# Patient Record
Sex: MALE | Race: WHITE | NOT HISPANIC OR LATINO | Employment: OTHER | URBAN - METROPOLITAN AREA
[De-identification: names, ages, dates, MRNs, and addresses within clinical notes are randomized per-mention and may not be internally consistent; named-entity substitution may affect disease eponyms.]

---

## 2019-05-09 ENCOUNTER — HOSPITAL ENCOUNTER (EMERGENCY)
Facility: HOSPITAL | Age: 74
Discharge: HOME | End: 2019-05-09
Attending: EMERGENCY MEDICINE | Admitting: EMERGENCY MEDICINE
Payer: COMMERCIAL

## 2019-05-09 VITALS
RESPIRATION RATE: 20 BRPM | HEART RATE: 72 BPM | TEMPERATURE: 96 F | HEIGHT: 78 IN | DIASTOLIC BLOOD PRESSURE: 92 MMHG | SYSTOLIC BLOOD PRESSURE: 178 MMHG | BODY MASS INDEX: 19.67 KG/M2 | WEIGHT: 170 LBS | OXYGEN SATURATION: 99 %

## 2019-05-09 DIAGNOSIS — K08.89 PAIN, DENTAL: Primary | ICD-10-CM

## 2019-05-09 PROCEDURE — 99283 EMERGENCY DEPT VISIT LOW MDM: CPT

## 2019-05-09 PROCEDURE — 96372 THER/PROPH/DIAG INJ SC/IM: CPT

## 2019-05-09 PROCEDURE — 99283 EMERGENCY DEPT VISIT LOW MDM: CPT | Performed by: EMERGENCY MEDICINE

## 2019-05-09 RX ORDER — AMOXICILLIN AND CLAVULANATE POTASSIUM 875; 125 MG/1; MG/1
1 TABLET, FILM COATED ORAL EVERY 12 HOURS SCHEDULED
Qty: 14 TABLET | Refills: 0 | Status: SHIPPED | OUTPATIENT
Start: 2019-05-09 | End: 2019-05-16

## 2019-05-09 RX ORDER — KETOROLAC TROMETHAMINE 30 MG/ML
15 INJECTION, SOLUTION INTRAMUSCULAR; INTRAVENOUS ONCE
Status: COMPLETED | OUTPATIENT
Start: 2019-05-09 | End: 2019-05-09

## 2019-05-09 RX ORDER — CHLORHEXIDINE GLUCONATE 0.12 MG/ML
15 RINSE ORAL 2 TIMES DAILY
Qty: 120 ML | Refills: 0 | Status: SHIPPED | OUTPATIENT
Start: 2019-05-09

## 2019-05-09 RX ORDER — OXYCODONE HYDROCHLORIDE AND ACETAMINOPHEN 5; 325 MG/1; MG/1
TABLET ORAL
COMMUNITY
Start: 2016-09-11

## 2019-05-09 RX ORDER — AMOXICILLIN AND CLAVULANATE POTASSIUM 875; 125 MG/1; MG/1
1 TABLET, FILM COATED ORAL ONCE
Status: COMPLETED | OUTPATIENT
Start: 2019-05-09 | End: 2019-05-09

## 2019-05-09 RX ORDER — OMEPRAZOLE 40 MG/1
CAPSULE, DELAYED RELEASE ORAL
COMMUNITY
Start: 2016-04-04 | End: 2019-05-10 | Stop reason: SDUPTHER

## 2019-05-09 RX ADMIN — KETOROLAC TROMETHAMINE 15 MG: 30 INJECTION, SOLUTION INTRAMUSCULAR; INTRAVENOUS at 04:14

## 2019-05-09 RX ADMIN — AMOXICILLIN AND CLAVULANATE POTASSIUM 1 TABLET: 875; 125 TABLET, FILM COATED ORAL at 04:17

## 2019-05-10 ENCOUNTER — OFFICE VISIT (OUTPATIENT)
Dept: URGENT CARE | Facility: CLINIC | Age: 74
End: 2019-05-10
Payer: COMMERCIAL

## 2019-05-10 VITALS
DIASTOLIC BLOOD PRESSURE: 81 MMHG | OXYGEN SATURATION: 97 % | RESPIRATION RATE: 19 BRPM | WEIGHT: 173 LBS | HEART RATE: 91 BPM | BODY MASS INDEX: 23.43 KG/M2 | SYSTOLIC BLOOD PRESSURE: 164 MMHG | TEMPERATURE: 98 F | HEIGHT: 72 IN

## 2019-05-10 DIAGNOSIS — K08.89 PAIN, DENTAL: Primary | ICD-10-CM

## 2019-05-10 PROCEDURE — 99213 OFFICE O/P EST LOW 20 MIN: CPT | Performed by: PHYSICIAN ASSISTANT

## 2019-05-10 RX ORDER — MONTELUKAST SODIUM 10 MG/1
TABLET ORAL EVERY 24 HOURS
COMMUNITY
Start: 2019-04-16

## 2019-05-10 RX ORDER — FLUTICASONE FUROATE AND VILANTEROL 100; 25 UG/1; UG/1
POWDER RESPIRATORY (INHALATION) EVERY 24 HOURS
COMMUNITY
Start: 2019-02-01 | End: 2019-09-12 | Stop reason: ALTCHOICE

## 2019-05-14 ENCOUNTER — HOSPITAL ENCOUNTER (INPATIENT)
Facility: HOSPITAL | Age: 74
LOS: 1 days | Discharge: HOME | DRG: 351 | End: 2019-05-16
Attending: EMERGENCY MEDICINE | Admitting: SURGERY
Payer: COMMERCIAL

## 2019-05-14 ENCOUNTER — APPOINTMENT (EMERGENCY)
Dept: CT IMAGING | Facility: HOSPITAL | Age: 74
DRG: 351 | End: 2019-05-14
Payer: COMMERCIAL

## 2019-05-14 DIAGNOSIS — K40.90 LEFT INGUINAL HERNIA: ICD-10-CM

## 2019-05-14 DIAGNOSIS — K56.609 SBO (SMALL BOWEL OBSTRUCTION) (HCC): ICD-10-CM

## 2019-05-14 DIAGNOSIS — R10.32 LLQ ABDOMINAL PAIN: Primary | ICD-10-CM

## 2019-05-14 LAB
ALBUMIN SERPL BCP-MCNC: 3.3 G/DL (ref 3.5–5)
ALP SERPL-CCNC: 95 U/L (ref 46–116)
ALT SERPL W P-5'-P-CCNC: 53 U/L (ref 12–78)
ANION GAP SERPL CALCULATED.3IONS-SCNC: 11 MMOL/L (ref 4–13)
AST SERPL W P-5'-P-CCNC: 51 U/L (ref 5–45)
BASOPHILS # BLD AUTO: 0.05 THOUSANDS/ΜL (ref 0–0.1)
BASOPHILS NFR BLD AUTO: 1 % (ref 0–1)
BILIRUB SERPL-MCNC: 0.5 MG/DL (ref 0.2–1)
BUN SERPL-MCNC: 20 MG/DL (ref 5–25)
CALCIUM SERPL-MCNC: 9.6 MG/DL (ref 8.3–10.1)
CHLORIDE SERPL-SCNC: 99 MMOL/L (ref 100–108)
CLARITY, POC: CLEAR
CO2 SERPL-SCNC: 26 MMOL/L (ref 21–32)
COLOR, POC: CLEAR
CREAT SERPL-MCNC: 0.97 MG/DL (ref 0.6–1.3)
EOSINOPHIL # BLD AUTO: 0.06 THOUSAND/ΜL (ref 0–0.61)
EOSINOPHIL NFR BLD AUTO: 1 % (ref 0–6)
ERYTHROCYTE [DISTWIDTH] IN BLOOD BY AUTOMATED COUNT: 12.6 % (ref 11.6–15.1)
EXT BILIRUBIN, UA: NEGATIVE
EXT BLOOD URINE: NEGATIVE
EXT GLUCOSE, UA: NEGATIVE
EXT KETONES: NEGATIVE
EXT NITRITE, UA: NEGATIVE
EXT PH, UA: 6.5
EXT PROTEIN, UA: NEGATIVE
EXT SPECIFIC GRAVITY, UA: 1.01
EXT UROBILINOGEN: 0.2
GFR SERPL CREATININE-BSD FRML MDRD: 77 ML/MIN/1.73SQ M
GLUCOSE SERPL-MCNC: 108 MG/DL (ref 65–140)
HCT VFR BLD AUTO: 42.9 % (ref 36.5–49.3)
HGB BLD-MCNC: 14.2 G/DL (ref 12–17)
IMM GRANULOCYTES # BLD AUTO: 0.02 THOUSAND/UL (ref 0–0.2)
IMM GRANULOCYTES NFR BLD AUTO: 0 % (ref 0–2)
LYMPHOCYTES # BLD AUTO: 1.44 THOUSANDS/ΜL (ref 0.6–4.47)
LYMPHOCYTES NFR BLD AUTO: 24 % (ref 14–44)
MCH RBC QN AUTO: 32.2 PG (ref 26.8–34.3)
MCHC RBC AUTO-ENTMCNC: 33.1 G/DL (ref 31.4–37.4)
MCV RBC AUTO: 97 FL (ref 82–98)
MONOCYTES # BLD AUTO: 0.63 THOUSAND/ΜL (ref 0.17–1.22)
MONOCYTES NFR BLD AUTO: 10 % (ref 4–12)
NEUTROPHILS # BLD AUTO: 3.89 THOUSANDS/ΜL (ref 1.85–7.62)
NEUTS SEG NFR BLD AUTO: 64 % (ref 43–75)
NRBC BLD AUTO-RTO: 0 /100 WBCS
PLATELET # BLD AUTO: 246 THOUSANDS/UL (ref 149–390)
PMV BLD AUTO: 11.7 FL (ref 8.9–12.7)
POTASSIUM SERPL-SCNC: 4.5 MMOL/L (ref 3.5–5.3)
PROT SERPL-MCNC: 7.8 G/DL (ref 6.4–8.2)
RBC # BLD AUTO: 4.41 MILLION/UL (ref 3.88–5.62)
SODIUM SERPL-SCNC: 136 MMOL/L (ref 136–145)
WBC # BLD AUTO: 6.09 THOUSAND/UL (ref 4.31–10.16)
WBC # BLD EST: NEGATIVE 10*3/UL

## 2019-05-14 PROCEDURE — 99285 EMERGENCY DEPT VISIT HI MDM: CPT

## 2019-05-14 PROCEDURE — 85025 COMPLETE CBC W/AUTO DIFF WBC: CPT | Performed by: PHYSICIAN ASSISTANT

## 2019-05-14 PROCEDURE — 96374 THER/PROPH/DIAG INJ IV PUSH: CPT

## 2019-05-14 PROCEDURE — 96361 HYDRATE IV INFUSION ADD-ON: CPT

## 2019-05-14 PROCEDURE — 74177 CT ABD & PELVIS W/CONTRAST: CPT

## 2019-05-14 PROCEDURE — 80053 COMPREHEN METABOLIC PANEL: CPT | Performed by: PHYSICIAN ASSISTANT

## 2019-05-14 PROCEDURE — 81002 URINALYSIS NONAUTO W/O SCOPE: CPT | Performed by: PHYSICIAN ASSISTANT

## 2019-05-14 PROCEDURE — 36415 COLL VENOUS BLD VENIPUNCTURE: CPT | Performed by: PHYSICIAN ASSISTANT

## 2019-05-14 PROCEDURE — 99285 EMERGENCY DEPT VISIT HI MDM: CPT | Performed by: PHYSICIAN ASSISTANT

## 2019-05-14 RX ORDER — MORPHINE SULFATE 4 MG/ML
4 INJECTION, SOLUTION INTRAMUSCULAR; INTRAVENOUS ONCE
Status: COMPLETED | OUTPATIENT
Start: 2019-05-14 | End: 2019-05-14

## 2019-05-14 RX ORDER — CHLORAL HYDRATE 500 MG
1000 CAPSULE ORAL DAILY
COMMUNITY

## 2019-05-14 RX ORDER — FAMOTIDINE 40 MG/1
40 TABLET, FILM COATED ORAL EVERY 12 HOURS
Refills: 1 | COMMUNITY
Start: 2019-02-27

## 2019-05-14 RX ORDER — POLYETHYLENE GLYCOL 3350 17 G/17G
17 POWDER, FOR SOLUTION ORAL DAILY
COMMUNITY

## 2019-05-14 RX ADMIN — MORPHINE SULFATE 4 MG: 4 INJECTION INTRAVENOUS at 21:53

## 2019-05-14 RX ADMIN — IOHEXOL 100 ML: 350 INJECTION, SOLUTION INTRAVENOUS at 22:44

## 2019-05-14 RX ADMIN — SODIUM CHLORIDE 1000 ML: 0.9 INJECTION, SOLUTION INTRAVENOUS at 21:48

## 2019-05-15 ENCOUNTER — ANESTHESIA (INPATIENT)
Dept: PERIOP | Facility: HOSPITAL | Age: 74
DRG: 351 | End: 2019-05-15
Payer: COMMERCIAL

## 2019-05-15 ENCOUNTER — ANESTHESIA EVENT (INPATIENT)
Dept: PERIOP | Facility: HOSPITAL | Age: 74
DRG: 351 | End: 2019-05-15
Payer: COMMERCIAL

## 2019-05-15 PROBLEM — K56.609 SBO (SMALL BOWEL OBSTRUCTION) (HCC): Status: ACTIVE | Noted: 2019-05-14

## 2019-05-15 PROBLEM — K40.90 LEFT INGUINAL HERNIA: Status: ACTIVE | Noted: 2019-05-14

## 2019-05-15 PROCEDURE — 49651 LAP ING HERNIA REPAIR RECUR: CPT | Performed by: SURGERY

## 2019-05-15 PROCEDURE — C1781 MESH (IMPLANTABLE): HCPCS | Performed by: SURGERY

## 2019-05-15 PROCEDURE — 0YU54JZ SUPPLEMENT RIGHT INGUINAL REGION WITH SYNTHETIC SUBSTITUTE, PERCUTANEOUS ENDOSCOPIC APPROACH: ICD-10-PCS | Performed by: SURGERY

## 2019-05-15 PROCEDURE — 99222 1ST HOSP IP/OBS MODERATE 55: CPT | Performed by: SURGERY

## 2019-05-15 PROCEDURE — 49651 LAP ING HERNIA REPAIR RECUR: CPT | Performed by: PHYSICIAN ASSISTANT

## 2019-05-15 DEVICE — BARD 3DMAX MESH LEFT LARGE
Type: IMPLANTABLE DEVICE | Site: INGUINAL | Status: FUNCTIONAL
Brand: BARD 3DMAX MESH

## 2019-05-15 RX ORDER — FENTANYL CITRATE/PF 50 MCG/ML
50 SYRINGE (ML) INJECTION
Status: DISCONTINUED | OUTPATIENT
Start: 2019-05-15 | End: 2019-05-15 | Stop reason: HOSPADM

## 2019-05-15 RX ORDER — CEFAZOLIN SODIUM 2 G/50ML
2000 SOLUTION INTRAVENOUS ONCE
Status: DISCONTINUED | OUTPATIENT
Start: 2019-05-15 | End: 2019-05-15 | Stop reason: HOSPADM

## 2019-05-15 RX ORDER — MAGNESIUM HYDROXIDE 1200 MG/15ML
LIQUID ORAL AS NEEDED
Status: DISCONTINUED | OUTPATIENT
Start: 2019-05-15 | End: 2019-05-15 | Stop reason: HOSPADM

## 2019-05-15 RX ORDER — CEFAZOLIN SODIUM 2 G/50ML
SOLUTION INTRAVENOUS AS NEEDED
Status: DISCONTINUED | OUTPATIENT
Start: 2019-05-15 | End: 2019-05-15 | Stop reason: SURG

## 2019-05-15 RX ORDER — SODIUM CHLORIDE 9 MG/ML
125 INJECTION, SOLUTION INTRAVENOUS CONTINUOUS
Status: DISCONTINUED | OUTPATIENT
Start: 2019-05-15 | End: 2019-05-16

## 2019-05-15 RX ORDER — GLYCOPYRROLATE 0.2 MG/ML
INJECTION INTRAMUSCULAR; INTRAVENOUS AS NEEDED
Status: DISCONTINUED | OUTPATIENT
Start: 2019-05-15 | End: 2019-05-15 | Stop reason: SURG

## 2019-05-15 RX ORDER — DEXAMETHASONE SODIUM PHOSPHATE 10 MG/ML
INJECTION, SOLUTION INTRAMUSCULAR; INTRAVENOUS AS NEEDED
Status: DISCONTINUED | OUTPATIENT
Start: 2019-05-15 | End: 2019-05-15 | Stop reason: SURG

## 2019-05-15 RX ORDER — ONDANSETRON 2 MG/ML
4 INJECTION INTRAMUSCULAR; INTRAVENOUS ONCE AS NEEDED
Status: DISCONTINUED | OUTPATIENT
Start: 2019-05-15 | End: 2019-05-15 | Stop reason: HOSPADM

## 2019-05-15 RX ORDER — MIDAZOLAM HYDROCHLORIDE 1 MG/ML
INJECTION INTRAMUSCULAR; INTRAVENOUS AS NEEDED
Status: DISCONTINUED | OUTPATIENT
Start: 2019-05-15 | End: 2019-05-15 | Stop reason: SURG

## 2019-05-15 RX ORDER — HYDROMORPHONE HCL/PF 1 MG/ML
0.5 SYRINGE (ML) INJECTION
Status: DISCONTINUED | OUTPATIENT
Start: 2019-05-15 | End: 2019-05-15 | Stop reason: HOSPADM

## 2019-05-15 RX ORDER — ROCURONIUM BROMIDE 10 MG/ML
INJECTION, SOLUTION INTRAVENOUS AS NEEDED
Status: DISCONTINUED | OUTPATIENT
Start: 2019-05-15 | End: 2019-05-15 | Stop reason: SURG

## 2019-05-15 RX ORDER — NEOSTIGMINE METHYLSULFATE 1 MG/ML
INJECTION INTRAVENOUS AS NEEDED
Status: DISCONTINUED | OUTPATIENT
Start: 2019-05-15 | End: 2019-05-15 | Stop reason: SURG

## 2019-05-15 RX ORDER — HEPARIN SODIUM 5000 [USP'U]/ML
5000 INJECTION, SOLUTION INTRAVENOUS; SUBCUTANEOUS EVERY 8 HOURS SCHEDULED
Status: DISCONTINUED | OUTPATIENT
Start: 2019-05-15 | End: 2019-05-16 | Stop reason: HOSPADM

## 2019-05-15 RX ORDER — SODIUM CHLORIDE, SODIUM LACTATE, POTASSIUM CHLORIDE, CALCIUM CHLORIDE 600; 310; 30; 20 MG/100ML; MG/100ML; MG/100ML; MG/100ML
INJECTION, SOLUTION INTRAVENOUS CONTINUOUS PRN
Status: DISCONTINUED | OUTPATIENT
Start: 2019-05-15 | End: 2019-05-15

## 2019-05-15 RX ORDER — PROPOFOL 10 MG/ML
INJECTION, EMULSION INTRAVENOUS AS NEEDED
Status: DISCONTINUED | OUTPATIENT
Start: 2019-05-15 | End: 2019-05-15 | Stop reason: SURG

## 2019-05-15 RX ORDER — SODIUM CHLORIDE, SODIUM LACTATE, POTASSIUM CHLORIDE, CALCIUM CHLORIDE 600; 310; 30; 20 MG/100ML; MG/100ML; MG/100ML; MG/100ML
75 INJECTION, SOLUTION INTRAVENOUS CONTINUOUS
Status: ACTIVE | OUTPATIENT
Start: 2019-05-15 | End: 2019-05-15

## 2019-05-15 RX ORDER — FENTANYL CITRATE 50 UG/ML
INJECTION, SOLUTION INTRAMUSCULAR; INTRAVENOUS AS NEEDED
Status: DISCONTINUED | OUTPATIENT
Start: 2019-05-15 | End: 2019-05-15 | Stop reason: SURG

## 2019-05-15 RX ORDER — HYDRALAZINE HYDROCHLORIDE 20 MG/ML
10 INJECTION INTRAMUSCULAR; INTRAVENOUS EVERY 6 HOURS PRN
Status: DISCONTINUED | OUTPATIENT
Start: 2019-05-15 | End: 2019-05-16 | Stop reason: HOSPADM

## 2019-05-15 RX ADMIN — FENTANYL CITRATE 100 MCG: 50 INJECTION, SOLUTION INTRAMUSCULAR; INTRAVENOUS at 13:28

## 2019-05-15 RX ADMIN — DEXAMETHASONE SODIUM PHOSPHATE 8 MG: 10 INJECTION, SOLUTION INTRAMUSCULAR; INTRAVENOUS at 14:11

## 2019-05-15 RX ADMIN — HEPARIN SODIUM 5000 UNITS: 5000 INJECTION INTRAVENOUS; SUBCUTANEOUS at 21:08

## 2019-05-15 RX ADMIN — GLYCOPYRROLATE 0.4 MG: 0.2 INJECTION, SOLUTION INTRAMUSCULAR; INTRAVENOUS at 14:19

## 2019-05-15 RX ADMIN — CEFAZOLIN SODIUM 2000 MG: 2 SOLUTION INTRAVENOUS at 13:33

## 2019-05-15 RX ADMIN — GLYCOPYRROLATE 0.1 MG: 0.2 INJECTION, SOLUTION INTRAMUSCULAR; INTRAVENOUS at 13:28

## 2019-05-15 RX ADMIN — SODIUM CHLORIDE 125 ML/HR: 0.9 INJECTION, SOLUTION INTRAVENOUS at 23:26

## 2019-05-15 RX ADMIN — NEOSTIGMINE METHYLSULFATE 2 MG: 1 INJECTION INTRAVENOUS at 14:19

## 2019-05-15 RX ADMIN — MIDAZOLAM 2 MG: 1 INJECTION INTRAMUSCULAR; INTRAVENOUS at 13:28

## 2019-05-15 RX ADMIN — SODIUM CHLORIDE 125 ML/HR: 0.9 INJECTION, SOLUTION INTRAVENOUS at 01:58

## 2019-05-15 RX ADMIN — ROCURONIUM BROMIDE 50 MG: 10 INJECTION, SOLUTION INTRAVENOUS at 13:28

## 2019-05-15 RX ADMIN — FENTANYL CITRATE 50 MCG: 50 INJECTION, SOLUTION INTRAMUSCULAR; INTRAVENOUS at 14:47

## 2019-05-15 RX ADMIN — FENTANYL CITRATE 50 MCG: 50 INJECTION, SOLUTION INTRAMUSCULAR; INTRAVENOUS at 14:28

## 2019-05-15 RX ADMIN — ROCURONIUM BROMIDE 10 MG: 10 INJECTION, SOLUTION INTRAVENOUS at 14:11

## 2019-05-15 RX ADMIN — FENTANYL CITRATE 50 MCG: 50 INJECTION, SOLUTION INTRAMUSCULAR; INTRAVENOUS at 14:57

## 2019-05-15 RX ADMIN — SODIUM CHLORIDE, SODIUM LACTATE, POTASSIUM CHLORIDE, AND CALCIUM CHLORIDE: .6; .31; .03; .02 INJECTION, SOLUTION INTRAVENOUS at 13:10

## 2019-05-15 RX ADMIN — FENTANYL CITRATE 50 MCG: 50 INJECTION, SOLUTION INTRAMUSCULAR; INTRAVENOUS at 14:11

## 2019-05-15 RX ADMIN — PROPOFOL 120 MG: 10 INJECTION, EMULSION INTRAVENOUS at 13:28

## 2019-05-15 RX ADMIN — METRONIDAZOLE 500 MG: 500 INJECTION, SOLUTION INTRAVENOUS at 13:41

## 2019-05-15 RX ADMIN — MORPHINE SULFATE 2 MG: 2 INJECTION, SOLUTION INTRAMUSCULAR; INTRAVENOUS at 20:29

## 2019-05-15 RX ADMIN — MORPHINE SULFATE 2 MG: 2 INJECTION, SOLUTION INTRAMUSCULAR; INTRAVENOUS at 18:30

## 2019-05-16 VITALS
HEIGHT: 72 IN | HEART RATE: 74 BPM | OXYGEN SATURATION: 99 % | TEMPERATURE: 97.8 F | WEIGHT: 165.34 LBS | RESPIRATION RATE: 18 BRPM | DIASTOLIC BLOOD PRESSURE: 77 MMHG | SYSTOLIC BLOOD PRESSURE: 142 MMHG | BODY MASS INDEX: 22.4 KG/M2

## 2019-05-16 LAB — HCV AB SER QL: NORMAL

## 2019-05-16 PROCEDURE — 86803 HEPATITIS C AB TEST: CPT | Performed by: PHYSICIAN ASSISTANT

## 2019-05-16 PROCEDURE — 99024 POSTOP FOLLOW-UP VISIT: CPT | Performed by: PHYSICIAN ASSISTANT

## 2019-05-16 RX ORDER — ACETAMINOPHEN 325 MG/1
650 TABLET ORAL EVERY 6 HOURS PRN
Status: DISCONTINUED | OUTPATIENT
Start: 2019-05-16 | End: 2019-05-16 | Stop reason: HOSPADM

## 2019-05-16 RX ORDER — HYDROCODONE BITARTRATE AND ACETAMINOPHEN 5; 325 MG/1; MG/1
1-2 TABLET ORAL EVERY 4 HOURS PRN
Qty: 20 TABLET | Refills: 0 | Status: SHIPPED | OUTPATIENT
Start: 2019-05-16 | End: 2019-05-26

## 2019-05-16 RX ORDER — OXYCODONE HYDROCHLORIDE AND ACETAMINOPHEN 5; 325 MG/1; MG/1
1 TABLET ORAL EVERY 4 HOURS PRN
Status: DISCONTINUED | OUTPATIENT
Start: 2019-05-16 | End: 2019-05-16 | Stop reason: HOSPADM

## 2019-05-16 RX ADMIN — HEPARIN SODIUM 5000 UNITS: 5000 INJECTION INTRAVENOUS; SUBCUTANEOUS at 05:38

## 2019-05-16 RX ADMIN — SODIUM CHLORIDE 125 ML/HR: 0.9 INJECTION, SOLUTION INTRAVENOUS at 08:19

## 2019-05-16 RX ADMIN — OXYCODONE AND ACETAMINOPHEN 1 TABLET: 5; 325 TABLET ORAL at 11:14

## 2019-05-16 RX ADMIN — ACETAMINOPHEN 650 MG: 325 TABLET, FILM COATED ORAL at 05:34

## 2019-05-21 ENCOUNTER — TELEPHONE (OUTPATIENT)
Dept: SURGERY | Facility: MEDICAL CENTER | Age: 74
End: 2019-05-21

## 2019-05-30 ENCOUNTER — OFFICE VISIT (OUTPATIENT)
Dept: SURGERY | Facility: HOSPITAL | Age: 74
End: 2019-05-30

## 2019-05-30 VITALS
WEIGHT: 168 LBS | DIASTOLIC BLOOD PRESSURE: 76 MMHG | BODY MASS INDEX: 22.75 KG/M2 | HEART RATE: 76 BPM | SYSTOLIC BLOOD PRESSURE: 118 MMHG | RESPIRATION RATE: 16 BRPM | HEIGHT: 72 IN | TEMPERATURE: 99 F

## 2019-05-30 DIAGNOSIS — Z09 POSTOP CHECK: Primary | ICD-10-CM

## 2019-05-30 PROCEDURE — 99024 POSTOP FOLLOW-UP VISIT: CPT | Performed by: SURGERY

## 2019-07-22 ENCOUNTER — TRANSCRIBE ORDERS (OUTPATIENT)
Dept: ADMINISTRATIVE | Facility: HOSPITAL | Age: 74
End: 2019-07-22

## 2019-07-22 ENCOUNTER — HOSPITAL ENCOUNTER (OUTPATIENT)
Dept: RADIOLOGY | Facility: HOSPITAL | Age: 74
Discharge: HOME | End: 2019-07-22
Attending: FAMILY MEDICINE
Payer: COMMERCIAL

## 2019-07-22 DIAGNOSIS — R06.00 DYSPNEA, UNSPECIFIED TYPE: ICD-10-CM

## 2019-07-22 DIAGNOSIS — R06.00 DYSPNEA, UNSPECIFIED TYPE: Primary | ICD-10-CM

## 2019-07-22 PROCEDURE — 71046 X-RAY EXAM CHEST 2 VIEWS: CPT

## 2019-09-12 ENCOUNTER — OFFICE VISIT (OUTPATIENT)
Dept: SURGERY | Facility: HOSPITAL | Age: 74
End: 2019-09-12
Payer: COMMERCIAL

## 2019-09-12 VITALS
WEIGHT: 163.8 LBS | HEIGHT: 72 IN | TEMPERATURE: 99.7 F | SYSTOLIC BLOOD PRESSURE: 126 MMHG | RESPIRATION RATE: 16 BRPM | HEART RATE: 80 BPM | BODY MASS INDEX: 22.19 KG/M2 | DIASTOLIC BLOOD PRESSURE: 80 MMHG

## 2019-09-12 DIAGNOSIS — R10.31 RIGHT GROIN PAIN: Primary | ICD-10-CM

## 2019-09-12 PROCEDURE — 99212 OFFICE O/P EST SF 10 MIN: CPT | Performed by: SURGERY

## 2019-09-12 NOTE — PROGRESS NOTES
Assessment/Plan: Becky Ngo is a 68year old male who presents today status post laparoscopic left inguinal hernia repair performed on 5/15/19  Physical exam revealed no signs of recurrent hernias  Explained to him that his groin pain is most likely due to irritation of the groin area while he was ridding his bike  He may follow up as needed  He knows to contact the office if any questions or concerns arise  No problem-specific Assessment & Plan notes found for this encounter  There are no diagnoses linked to this encounter  Subjective:      Patient ID: Yesica Bustamante is a 68 y o  male  Becky Ngo is a 68year old male who presents today status post laparoscopic left inguinal hernia repair with mesh performed on 5/15/19  He reports having minor groin pain after a bike ride  He says that over all he is doing very well  The following portions of the patient's history were reviewed and updated as appropriate: allergies, current medications, past family history, past medical history, past social history, past surgical history and problem list     Review of Systems      Objective: There were no vitals taken for this visit  Physical Exam   Abdominal: No hernia  No obvious signs of recurrent hernias          By signing my name below, Marta Huffchastity, attest that this documentation has been prepared under the direction and in the presence of Adis Schmidt MD  Electronically Signed: Debra Tyler  9/12/19  Clarissa Cruz, personally performed the services described in this documentation  All medical record entries made by the scriboliver were at my direction and in my presence  I have reviewed the chart and discharge instructions and agree that the record reflects my personal performance and is accurate and complete  Adis Schmidt MD  9/12/19

## 2021-01-20 DIAGNOSIS — Z23 ENCOUNTER FOR IMMUNIZATION: ICD-10-CM

## 2021-02-12 ENCOUNTER — IMMUNIZATIONS (OUTPATIENT)
Dept: FAMILY MEDICINE CLINIC | Facility: HOSPITAL | Age: 76
End: 2021-02-12

## 2021-02-12 DIAGNOSIS — Z23 ENCOUNTER FOR IMMUNIZATION: Primary | ICD-10-CM

## 2021-02-12 PROCEDURE — 0011A SARS-COV-2 / COVID-19 MRNA VACCINE (MODERNA) 100 MCG: CPT

## 2021-02-12 PROCEDURE — 91301 SARS-COV-2 / COVID-19 MRNA VACCINE (MODERNA) 100 MCG: CPT

## 2021-03-12 ENCOUNTER — IMMUNIZATIONS (OUTPATIENT)
Dept: FAMILY MEDICINE CLINIC | Facility: HOSPITAL | Age: 76
End: 2021-03-12

## 2021-03-12 DIAGNOSIS — Z23 ENCOUNTER FOR IMMUNIZATION: Primary | ICD-10-CM

## 2021-03-12 PROCEDURE — 91301 SARS-COV-2 / COVID-19 MRNA VACCINE (MODERNA) 100 MCG: CPT

## 2021-03-12 PROCEDURE — 0012A SARS-COV-2 / COVID-19 MRNA VACCINE (MODERNA) 100 MCG: CPT

## 2024-04-02 ENCOUNTER — HOSPITAL ENCOUNTER (OUTPATIENT)
Dept: CT IMAGING | Facility: HOSPITAL | Age: 79
Discharge: HOME | End: 2024-04-02
Attending: OTOLARYNGOLOGY
Payer: COMMERCIAL

## 2024-04-02 DIAGNOSIS — J32.9 CHRONIC SINUSITIS, UNSPECIFIED LOCATION: ICD-10-CM

## 2024-04-02 PROCEDURE — 70486 CT MAXILLOFACIAL W/O DYE: CPT

## 2024-10-04 ENCOUNTER — CLINICAL SUPPORT (OUTPATIENT)
Dept: CARDIAC REHAB | Facility: HOSPITAL | Age: 79
End: 2024-10-04
Attending: INTERNAL MEDICINE
Payer: COMMERCIAL

## 2024-10-04 DIAGNOSIS — I21.3 ST ELEVATION MYOCARDIAL INFARCTION (STEMI), UNSPECIFIED ARTERY (HCC): Primary | ICD-10-CM

## 2024-10-04 PROCEDURE — 93797 PHYS/QHP OP CAR RHAB WO ECG: CPT

## 2024-10-04 NOTE — PROGRESS NOTES
CARDIAC REHABILITATION   ASSESSMENT AND INDIVIDUALIZED TREATMENT PLAN  INITIAL           Today's date: 10/4/2024   # of Exercise Sessions Completed: 1 - Initial Evaluation Today  Patient name: Arturo Terry      : 1945  Age: 78 y.o.       MRN: [id number]  Referring Physician: Brenden Leggett MD ([facility]) - Dr. Villa  Cardiologist: Brenden Leggett MD ([facility]) - ITPs send to Dr. Villa  Provider: [place]  Clinician: Palma Norris MS, CEP        Treatment is tailored to this patient's individual needs.  The ITP was reviewed with the patient and all questions were answered to their satisfaction.  Additional ITP documentation can be found electronically including daily and monthly exercise summaries, daily session notes with ECG summaries, education notes, daily medication reconciliation, and daily physician supervision.      Comments: Arturo presented today for his initial evaluation to cardiac rehab following a recent STEMI s/p LOUIE. He reports feeling well overall and no cardiac complaints at this time. His goals for the program are to return to yard work and return to riding his bike next spring. He also has a stationary bike at home he would like to begin using.         Dx:   Encounter Diagnosis   Name Primary?    ST elevation myocardial infarction (STEMI), unspecified artery (HCC) Yes       Description of Diagnosis: ST elevation myocardial infarction of anterior wall s/p LOUIE to proximal LAD  Date of onset: 2024  Other Cardiac History: afib following MI        ASSESSMENT    Medical History:   Past Medical History:   Diagnosis Date    GERD (gastroesophageal reflux disease)     Hypertension     Ulcerative colitis (HCC)        Family History:  No family history on file.    Allergies:   Amoxicillin-pot clavulanate, Azithromycin, and Fluticasone    Current Medications:   Current Outpatient Medications   Medication Sig Dispense Refill    ALPRAZolam (XANAX) 0.5 mg tablet Take 0.5 mg by mouth      amLODIPine (NORVASC) 5  mg tablet Take 5 mg by mouth every 24 hours      Aspirin 81 MG CAPS Take 81 mg by mouth      atorvastatin (LIPITOR) 20 mg tablet Take 20 mg by mouth daily at bedtime      calcium carbonate (TUMS EX) 750 mg chewable tablet Chew 1 tablet daily.      calcium carbonate-vitamin D (OSCAL-D) 500 mg-200 units per tablet Take 1 tablet by mouth daily with breakfast.      chlorhexidine (PERIDEX) 0.12 % solution Apply 15 mL to the mouth or throat 2 (two) times a day (Patient not taking: Reported on 9/12/2019) 120 mL 0    famotidine (PEPCID) 40 MG tablet Take 40 mg by mouth every 12 (twelve) hours  1    fluticasone-umeclidinium-vilanterol (TRELEGY ELLIPTA) 100-62.5-25 MCG/INH inhaler Inhale 1 puff daily Rinse mouth after use.      hydrocortisone (ANUSOL-HC) 2.5 % rectal cream Apply topically 2 (two) times a day prn      ipratropium (ATROVENT) 0.03 % nasal spray 2 sprays into each nostril 3 (three) times a day as needed for rhinitis 30 mL 3    mesalamine (LIALDA) 1.2 G EC tablet Take 1,200 mg by mouth daily with breakfast.      metoprolol succinate (TOPROL-XL) 25 mg 24 hr tablet Take 25 mg by mouth daily      montelukast (SINGULAIR) 10 mg tablet every 24 hours      Omega-3 Fatty Acids (FISH OIL) 1,000 mg Take 1,000 mg by mouth daily      omeprazole (PriLOSEC) 40 MG capsule Take 40 mg by mouth daily.      oxyCODONE-acetaminophen (PERCOCET) 5-325 mg per tablet Take by mouth      pantoprazole (PROTONIX) 40 mg tablet Take 40 mg by mouth daily      polyethylene glycol (MIRALAX) 17 g packet Take 17 g by mouth daily      sertraline (ZOLOFT) 50 mg tablet Take 100 mg by mouth daily      vitamin E, tocopherol, 400 units capsule Take 400 Units by mouth daily.       No current facility-administered medications for this visit.       Medication compliance: Yes   Comments: Pt reports to be compliant with medications    Physical Limitations: occasional knee pain going up stairs    Fall Risk: Low   Comments: Ambulates with a steady gait with no  assist device    Cultural needs: none      CAD Risk Factors:  Cholesterol: Yes  HTN: Yes  DM: No  Obesity: No   Inactivity: Yes      EXERCISE ASSESSMENT:     Initial Fitness Assessment: Submaximal TM ETT:  Resting:  BP: 114/72  HR: 80, Exercise:  BP: 136/76  HR: 94, METs:  4.3, ECG Summary: NSR, and Test terminated at:  RPE 6      ECG INTERPRETATION:  NSR    Current Functional Status:  Occupation: retired  Recreation/Physical Activity: wood working, yard work  ADL’s:Capable of performing light to moderate ADLs  [place]: Capable of performing light to moderate ADLs  Home exercise: none  Other Comments: has stationary bike at home      SMART Exercise Goals:   10% improvement in functional capacity based on max METs achieved in initial fitness assessment  improved DASI score by 10%  increased exercise capacity by 40% based on peak METs tolerated in cardiac rehab exercise session  maintain > 150 minutes per week of moderate intensity exercise    Patient Specific EXERCISE GOALS:       Get back to doing yard work (raking leaves, etc)  Return to riding bike next spring  Use stationary bike at home    Functional Capacity Screening Tool:  [facility] Activity Status Index:  4.73 METs      PSYCHOSOCIAL ASSESSMENT:    Date of last Assessment:  10/4/2024  Depression screening:  PHQ-9 = 1    Interpretation:  1-4 = Minimal Depression  Anxiety screening:  NEEL-7 = 1    Interpretation: 0-4  = Not anxious    Pt self-report of depression and anxiety   Patient has a history of anxiety   Reports sufficient emotional support     Self-reported stress level:  3   Stressors:  managing current health condition and wives health as well  Stress Management Tools:  woodworking    SMART Psychosocial Goals:     Physical Fitness in [facility] Score < 3    Patient Specific PSYCHOCOSOCIAL GOALS:    Continue to manage stress/anxiety  Continue wood working as a stress reliever    Quality of Life Screen:  (Higher score indicates disease impact on  "QOL)  [facility] COOP score: 14/45     Social Support:   children  Community/Social Activities: spending time with family, wood working with a friend     Psychosocial Assessment as it relates to rehabilitation:   Patient denies issues with his/her family or home life that may affect their rehabilitation efforts.       NUTRITION ASSESSMENT:    Initial Weight:  179.2 lbs  Current Weight:     Height:   Ht Readings from Last 1 Encounters:   08/02/24 6' (1.829 m)       Rate Your Plate Score: 56/81    Diabetes: N/A  A1c: no labs in chart   last measured: n/a    Lipid management:  no labs in chart    Current Dietary Habits:  \"Fair diet\". Watches sodium intake, eats a lot of fruits, could eat more vegetables, drinks two 24 oz bottles of water/day    SMART Nutrition Goals:   Improved Rate Your Plate score  >58    Patient Specific NUTRITION GOALS:     1. Utilize meal plan service that delivers fresh ingredients ready to use in a meal   3. Increase vegetable intake    Drug/Alcohol Use:   N/A      OTHER CORE COMPONENT ASSESSMENT:    Tobacco Use:     N/A:  Patient is a non-smoker     Anginal Symptoms:   chest pain   NTG use: No prescription    SMART Goals:   consistent, controlled resting BP < 130/80 and medication compliance    Patient Specific CORE COMPONENT GOALS:    Continue to check weight weekly at home and reports any abnormal changes  Continue to monitor BP at home and reports any abnormal trends      INDIVIDUALIZED TREATMENT PLAN      EXERCISE GOALS and PLAN      Progress toward Exercise goals:   Reviewed Pt goals and determined plan of care, Will continue to educate and progress as tolerated.    Exercise Intervention/plan:    education on home exercise guidelines, home exercise 30+ mins 2 days opposite CR, Group class: Risk Factors for Heart Disease, and Patient education:   Exercise After Cardiac Rehabilitation    The patient was counseled on exercise guidelines to achieve a minimum of 150 mins/wk of moderate intensity " (RPE 4-6)   exercise and encouraged to add 1-2 days of exercise on opposite days of cardiac rehab as tolerated.       PHYSICIAN PRESCRIBED EXERCISE:    Current Aerobic Exercise Prescription:      Frequency: 3 days/week   Supplement with home exercise 2+ days/wk as tolerated       Minutes: 20 - 40         METS: 3.5 - 4.5            HR: RHR +30-40bpm   RPE: 4-6         Modalities: Treadmill, UBE, Lifecycle, NuStep, and Recumbent bike     Exercise workloads will be progressed gradually as tolerated, within limits of patient's ability, and according to the patient's   response to the exercise program.      Aerobic Exercise Prescription Plan for Progression   Frequency: 3 days/week of cardiac rehab       Supplement with home exercise 2+ days/wk as tolerated    Minutes: 40       >150 mins/wk of moderate intensity exercise   METS: 4.0 - 5.0   HR: RHR +30-40bpm     RPE: 4-6   Modalities: Treadmill, UBE, Lifecycle, Rower, NuStep, and Recumbent bike    Strength trainin-3 days / week  12-15 repetitions  1-2 sets per modality   Will be added following 2-3 weeks of monitored exercise sessions   Modalities: Pull Downs, Lateral Raise, Arm Extension, Arm Curl, and leg extensions    Home Exercise: none    Exercise Education: benefit of exercise for CAD risk factors, home exercise guidelines, AHA guidelines to achieve >150 mins/wk of moderate exercise, and RPE scale     Readiness to change: Preparation:  (Getting ready to change)       NUTRITION GOALS AND PLAN      Nutritional   Reviewed patient's Rate your Plate. Discussed key elements of heart healthy eating. Reviewed patient goals for dietary modifications and their clinical implications.  Reviewed most recent lipid profile.     Patient's progress toward Nutrition goals:    Reviewed Pt goals and determined plan of care, Will continue to educate and progress as tolerated.      Nutrition Intervention/plan:   group class: Reading Food Labels and group Class: Heart Healthy  Eating    Measurable goals were based Rate Your Plate Dietary Self-Assessment. These are the areas in which the patient could score higher on the assessment.  Goals include recommendations for a heart healthy diet based on American Heart Association.    Nutrition Education:   heart healthy eating principles  low sodium diet  maintaining hydration  nutrition for  lipid management    Readiness to change: Preparation:  (Getting ready to change)       PSYCHOSOCIAL GOALS AND PLAN    Psychosocial Assessment as it relates to rehabilitation:   Patient denies issues with his/her family or home life that may affect their rehabilitation efforts.     Patient's progress toward Psychosocial goals:    Reviewed Pt goals and determined plan of care, Will continue to educate and progress as tolerated.    Psychosocial Intervention/plan:   Class: Stress and Your Health and Class: Relaxation    Psychosocial Education: signs/sxs of depression, benefits of a positive support system, and stress management techniques    Information to utilize [employer] was provided as well as contact information for counseling through  Behavioral Health and group psychotherapy groups available.    Readiness to change: Preparation:  (Getting ready to change)       OTHER CORE COMPONENTS GOALS and PLAN      Blood Pressure will be monitored throughout the program and cardiologist will be notified of elevated trends.    Pt will be encouraged to monitor home BP if advised by cardiologist.    Tobacco Intervention/plan:   N/A:  Pt is a non-smoker    Progress toward Core Component goals:   Reviewed Pt goals and determined plan of care, Will continue to educate and progress as tolerated.    Other Core Components Intervention:   group class: Understanding Heart Disease, group class: Common Heart Medications, medication compliance, use salt substitutes, check labels for sodium content, and monitor home BP    Group and Individual Education:  understanding high  blood pressure and it's relationship to CAD and components of blood pressure management    Readiness to change: Preparation:  (Getting ready to change)

## 2024-10-07 ENCOUNTER — CLINICAL SUPPORT (OUTPATIENT)
Dept: CARDIAC REHAB | Facility: HOSPITAL | Age: 79
End: 2024-10-07
Payer: COMMERCIAL

## 2024-10-07 DIAGNOSIS — I21.3 ST ELEVATION MYOCARDIAL INFARCTION (STEMI), UNSPECIFIED ARTERY (HCC): Primary | ICD-10-CM

## 2024-10-07 PROCEDURE — 93798 PHYS/QHP OP CAR RHAB W/ECG: CPT

## 2024-10-09 ENCOUNTER — CLINICAL SUPPORT (OUTPATIENT)
Dept: CARDIAC REHAB | Facility: HOSPITAL | Age: 79
End: 2024-10-09
Payer: COMMERCIAL

## 2024-10-09 DIAGNOSIS — I21.3 ST ELEVATION MYOCARDIAL INFARCTION (STEMI), UNSPECIFIED ARTERY (HCC): Primary | ICD-10-CM

## 2024-10-09 PROCEDURE — 93798 PHYS/QHP OP CAR RHAB W/ECG: CPT

## 2024-10-11 ENCOUNTER — CLINICAL SUPPORT (OUTPATIENT)
Dept: CARDIAC REHAB | Facility: HOSPITAL | Age: 79
End: 2024-10-11
Payer: COMMERCIAL

## 2024-10-11 DIAGNOSIS — I21.3 ST ELEVATION MYOCARDIAL INFARCTION (STEMI), UNSPECIFIED ARTERY (HCC): Primary | ICD-10-CM

## 2024-10-11 PROCEDURE — 93798 PHYS/QHP OP CAR RHAB W/ECG: CPT

## 2024-10-14 ENCOUNTER — CLINICAL SUPPORT (OUTPATIENT)
Dept: CARDIAC REHAB | Facility: HOSPITAL | Age: 79
End: 2024-10-14
Payer: COMMERCIAL

## 2024-10-14 DIAGNOSIS — I21.3 ST ELEVATION MYOCARDIAL INFARCTION (STEMI), UNSPECIFIED ARTERY (HCC): Primary | ICD-10-CM

## 2024-10-14 PROCEDURE — 93798 PHYS/QHP OP CAR RHAB W/ECG: CPT

## 2024-10-16 ENCOUNTER — CLINICAL SUPPORT (OUTPATIENT)
Dept: CARDIAC REHAB | Facility: HOSPITAL | Age: 79
End: 2024-10-16
Payer: COMMERCIAL

## 2024-10-16 DIAGNOSIS — I21.3 ST ELEVATION MYOCARDIAL INFARCTION (STEMI), UNSPECIFIED ARTERY (HCC): Primary | ICD-10-CM

## 2024-10-16 PROCEDURE — 93798 PHYS/QHP OP CAR RHAB W/ECG: CPT

## 2024-10-18 ENCOUNTER — CLINICAL SUPPORT (OUTPATIENT)
Dept: CARDIAC REHAB | Facility: HOSPITAL | Age: 79
End: 2024-10-18
Payer: COMMERCIAL

## 2024-10-18 DIAGNOSIS — I21.3 ST ELEVATION MYOCARDIAL INFARCTION (STEMI), UNSPECIFIED ARTERY (HCC): Primary | ICD-10-CM

## 2024-10-18 PROCEDURE — 93798 PHYS/QHP OP CAR RHAB W/ECG: CPT

## 2024-10-21 ENCOUNTER — CLINICAL SUPPORT (OUTPATIENT)
Dept: CARDIAC REHAB | Facility: HOSPITAL | Age: 79
End: 2024-10-21
Payer: COMMERCIAL

## 2024-10-21 DIAGNOSIS — I21.3 ST ELEVATION MYOCARDIAL INFARCTION (STEMI), UNSPECIFIED ARTERY (HCC): Primary | ICD-10-CM

## 2024-10-21 PROCEDURE — 93798 PHYS/QHP OP CAR RHAB W/ECG: CPT

## 2024-10-23 ENCOUNTER — CLINICAL SUPPORT (OUTPATIENT)
Dept: CARDIAC REHAB | Facility: HOSPITAL | Age: 79
End: 2024-10-23
Payer: COMMERCIAL

## 2024-10-23 DIAGNOSIS — I21.3 ST ELEVATION MYOCARDIAL INFARCTION (STEMI), UNSPECIFIED ARTERY (HCC): Primary | ICD-10-CM

## 2024-10-23 PROCEDURE — 93798 PHYS/QHP OP CAR RHAB W/ECG: CPT

## 2024-10-25 ENCOUNTER — CLINICAL SUPPORT (OUTPATIENT)
Dept: CARDIAC REHAB | Facility: HOSPITAL | Age: 79
End: 2024-10-25
Payer: COMMERCIAL

## 2024-10-25 DIAGNOSIS — I21.3 ST ELEVATION MYOCARDIAL INFARCTION (STEMI), UNSPECIFIED ARTERY (HCC): Primary | ICD-10-CM

## 2024-10-25 PROCEDURE — 93798 PHYS/QHP OP CAR RHAB W/ECG: CPT

## 2024-10-28 ENCOUNTER — APPOINTMENT (OUTPATIENT)
Dept: CARDIAC REHAB | Facility: HOSPITAL | Age: 79
End: 2024-10-28
Payer: COMMERCIAL

## 2024-10-30 ENCOUNTER — CLINICAL SUPPORT (OUTPATIENT)
Dept: CARDIAC REHAB | Facility: HOSPITAL | Age: 79
End: 2024-10-30
Payer: COMMERCIAL

## 2024-10-30 DIAGNOSIS — I21.3 ST ELEVATION MYOCARDIAL INFARCTION (STEMI), UNSPECIFIED ARTERY (HCC): Primary | ICD-10-CM

## 2024-10-30 PROCEDURE — 93798 PHYS/QHP OP CAR RHAB W/ECG: CPT

## 2024-10-30 NOTE — PROGRESS NOTES
CARDIAC REHABILITATION   ASSESSMENT AND INDIVIDUALIZED TREATMENT PLAN  30 DAY          Today's date: 10/30/2024   # of Exercise Sessions Completed: 11  Patient name: Arturo Terry      : 1945  Age: 79 y.o.       MRN: [id number]  Referring Physician: Brenden Leggett MD ([facility]) - Dr. Villa  Cardiologist: Brenden Leggett MD ([facility]) - ITPs send to Dr. Villa  Provider: [place]  Clinician: Palma Norris MS, CEP        Treatment is tailored to this patient's individual needs.  The ITP was reviewed with the patient and all questions were answered to their satisfaction.  Additional ITP documentation can be found electronically including daily and monthly exercise summaries, daily session notes with ECG summaries, education notes, daily medication reconciliation, and daily physician supervision.      Comments: Arturo has attended 11 sessions of CR in the past 30 days and is progressing well. He has no cardiac complaints at this time and reports improvement in his strength and endurance. He has been able to reach up to 3.41 METs with exercise in rehab. A light strength training component has been added to his routine at rehab.         Dx:   Encounter Diagnosis   Name Primary?    ST elevation myocardial infarction (STEMI), unspecified artery (HCC) Yes       Description of Diagnosis: ST elevation myocardial infarction of anterior wall s/p LOUIE to proximal LAD  Date of onset: 2024  Other Cardiac History: afib following MI        ASSESSMENT    Medical History:   Past Medical History:   Diagnosis Date    GERD (gastroesophageal reflux disease)     Hypertension     Ulcerative colitis (HCC)        Family History:  No family history on file.    Allergies:   Amoxicillin-pot clavulanate, Azithromycin, and Fluticasone    Current Medications:   Current Outpatient Medications   Medication Sig Dispense Refill    ALPRAZolam (XANAX) 0.5 mg tablet Take 0.5 mg by mouth      amLODIPine (NORVASC) 5 mg tablet Take 5 mg by mouth every 24 hours       Aspirin 81 MG CAPS Take 81 mg by mouth      atorvastatin (LIPITOR) 20 mg tablet Take 20 mg by mouth daily at bedtime      calcium carbonate (TUMS EX) 750 mg chewable tablet Chew 1 tablet daily.      calcium carbonate-vitamin D (OSCAL-D) 500 mg-200 units per tablet Take 1 tablet by mouth daily with breakfast.      chlorhexidine (PERIDEX) 0.12 % solution Apply 15 mL to the mouth or throat 2 (two) times a day (Patient not taking: Reported on 9/12/2019) 120 mL 0    famotidine (PEPCID) 40 MG tablet Take 40 mg by mouth every 12 (twelve) hours  1    fluticasone-umeclidinium-vilanterol (TRELEGY ELLIPTA) 100-62.5-25 MCG/INH inhaler Inhale 1 puff daily Rinse mouth after use.      hydrocortisone (ANUSOL-HC) 2.5 % rectal cream Apply topically 2 (two) times a day prn      ipratropium (ATROVENT) 0.03 % nasal spray 2 sprays into each nostril 3 (three) times a day as needed for rhinitis 30 mL 3    mesalamine (LIALDA) 1.2 G EC tablet Take 1,200 mg by mouth daily with breakfast.      metoprolol succinate (TOPROL-XL) 25 mg 24 hr tablet Take 25 mg by mouth daily      montelukast (SINGULAIR) 10 mg tablet every 24 hours      Omega-3 Fatty Acids (FISH OIL) 1,000 mg Take 1,000 mg by mouth daily      omeprazole (PriLOSEC) 40 MG capsule Take 40 mg by mouth daily.      oxyCODONE-acetaminophen (PERCOCET) 5-325 mg per tablet Take by mouth      pantoprazole (PROTONIX) 40 mg tablet Take 40 mg by mouth daily      polyethylene glycol (MIRALAX) 17 g packet Take 17 g by mouth daily      sertraline (ZOLOFT) 50 mg tablet Take 100 mg by mouth daily      vitamin E, tocopherol, 400 units capsule Take 400 Units by mouth daily.       No current facility-administered medications for this visit.       Medication compliance: Yes   Comments: Pt reports to be compliant with medications    Physical Limitations: occasional knee pain going up stairs    Fall Risk: Low   Comments: Ambulates with a steady gait with no assist device    Cultural needs:  none      CAD Risk Factors:  Cholesterol: Yes  HTN: Yes  DM: No  Obesity: No   Inactivity: Yes      EXERCISE ASSESSMENT:     Initial Fitness Assessment: Submaximal TM ETT:  Resting:  BP: 114/72  HR: 80, Exercise:  BP: 136/76  HR: 94, METs:  4.3, ECG Summary: NSR, and Test terminated at:  RPE 6      ECG INTERPRETATION:  NSR    Current Functional Status:  Occupation: retired  Recreation/Physical Activity: wood working, yard work  ADL’s:Capable of performing light to moderate ADLs  Coos: Capable of performing light to moderate ADLs  Home exercise: none  Other Comments: has stationary bike at home      SMART Exercise Goals:   10% improvement in functional capacity based on max METs achieved in initial fitness assessment  improved DASI score by 10%  increased exercise capacity by 40% based on peak METs tolerated in cardiac rehab exercise session  maintain > 150 minutes per week of moderate intensity exercise    Patient Specific EXERCISE GOALS:       Get back to doing yard work (raking leaves, etc)  Return to riding bike next spring  Use stationary bike at home    Functional Capacity Screening Tool:  [facility] Activity Status Index:  4.73 METs      PSYCHOSOCIAL ASSESSMENT:    Date of last Assessment:  10/4/2024  Depression screening:  PHQ-9 = 1    Interpretation:  1-4 = Minimal Depression  Anxiety screening:  NEEL-7 = 1    Interpretation: 0-4  = Not anxious    Pt self-report of depression and anxiety   Patient has a history of anxiety   Reports sufficient emotional support     Self-reported stress level:  3   Stressors:  managing current health condition and wives health as well  Stress Management Tools:  woodworking    SMART Psychosocial Goals:     Physical Fitness in [facility] Score < 3    Patient Specific PSYCHOCOSOCIAL GOALS:    Continue to manage stress/anxiety  Continue wood working as a stress reliever    Quality of Life Screen:  (Higher score indicates disease impact on QOL)  [facility] COOP score:  "14/45     Social Support:   children  Community/Social Activities: spending time with family, wood working with a friend     Psychosocial Assessment as it relates to rehabilitation:   Patient denies issues with his/her family or home life that may affect their rehabilitation efforts.       NUTRITION ASSESSMENT:    Initial Weight:  179.2 lbs  Current Weight: 181.8 lbs    Height:   Ht Readings from Last 1 Encounters:   08/02/24 6' (1.829 m)       Rate Your Plate Score: 56/81    Diabetes: N/A  A1c: no labs in chart   last measured: n/a    Lipid management:  no labs in chart    Current Dietary Habits:  \"Fair diet\". Watches sodium intake, eats a lot of fruits, could eat more vegetables, drinks two 24 oz bottles of water/day    SMART Nutrition Goals:   Improved Rate Your Plate score  >58    Patient Specific NUTRITION GOALS:     1. Utilize meal plan service that delivers fresh ingredients ready to use in a meal   3. Increase vegetable intake    Drug/Alcohol Use:   N/A      OTHER CORE COMPONENT ASSESSMENT:    Tobacco Use:     N/A:  Patient is a non-smoker     Anginal Symptoms:   chest pain   NTG use: No prescription    SMART Goals:   consistent, controlled resting BP < 130/80 and medication compliance    Patient Specific CORE COMPONENT GOALS:    Continue to check weight weekly at home and reports any abnormal changes  Continue to monitor BP at home and reports any abnormal trends      INDIVIDUALIZED TREATMENT PLAN      EXERCISE GOALS and PLAN      Progress toward Exercise goals:   Pt is progressing and showing improvement  toward the following goals:  increasing exercise capacity in rehab as tolerated, maintaining >150 min/wk of moderate intensity exercise, is back to doing yard work such as raking leaves without issue, has not been back to riding his bike yet however plans to do so this upcoming spring.  , Will continue to educate and progress as tolerated.    Exercise Intervention/plan:    education on home exercise " guidelines, home exercise 30+ mins 2 days opposite CR, Group class: Risk Factors for Heart Disease, and Patient education:   Exercise After Cardiac Rehabilitation    The patient was counseled on exercise guidelines to achieve a minimum of 150 mins/wk of moderate intensity (RPE 4-6)   exercise and encouraged to add 1-2 days of exercise on opposite days of cardiac rehab as tolerated.       PHYSICIAN PRESCRIBED EXERCISE:    Current Aerobic Exercise Prescription:      Frequency: 3 days/week   Supplement with home exercise 2+ days/wk as tolerated       Minutes: 20 - 40         METS: 3.0 - 3.5            HR: RHR +30-40bpm   RPE: 4-6         Modalities: Treadmill, UBE, Lifecycle, NuStep, and Recumbent bike     Exercise workloads will be progressed gradually as tolerated, within limits of patient's ability, and according to the patient's   response to the exercise program.      Aerobic Exercise Prescription Plan for Progression   Frequency: 3 days/week of cardiac rehab       Supplement with home exercise 2+ days/wk as tolerated    Minutes: 40       >150 mins/wk of moderate intensity exercise   METS: 3.5 - 4.9   HR: RHR +30-40bpm     RPE: 4-6   Modalities: Treadmill, UBE, Lifecycle, Rower, NuStep, and Recumbent bike    Strength trainin-3 days / week  12-15 repetitions  1-2 sets per modality    Modalities: Pull Downs, Lateral Raise, Arm Extension, Arm Curl, and leg extensions    Home Exercise: none    Exercise Education: benefit of exercise for CAD risk factors, home exercise guidelines, AHA guidelines to achieve >150 mins/wk of moderate exercise, RPE scale, and Group class: Risk Factors for Heart Disease     Readiness to change: Action:  (Changing behavior)      NUTRITION GOALS AND PLAN      Nutritional   Reviewed patient's Rate your Plate. Discussed key elements of heart healthy eating. Reviewed patient goals for dietary modifications and their clinical implications.  Reviewed most recent lipid profile.     Patient's  progress toward Nutrition goals:    Pt is progressing and showing improvement  toward the following goals:  continues to utilize meal plan service, increasing vegetable intake as tolerated.  , Will continue to educate and progress as tolerated.      Nutrition Intervention/plan:   group class: Reading Food Labels and group Class: Heart Healthy Eating    Measurable goals were based Rate Your Plate Dietary Self-Assessment. These are the areas in which the patient could score higher on the assessment.  Goals include recommendations for a heart healthy diet based on American Heart Association.    Nutrition Education:   heart healthy eating principles  low sodium diet  maintaining hydration  nutrition for  lipid management    Readiness to change: Action:  (Changing behavior)      PSYCHOSOCIAL GOALS AND PLAN    Psychosocial Assessment as it relates to rehabilitation:   Patient denies issues with his/her family or home life that may affect their rehabilitation efforts.     Patient's progress toward Psychosocial goals:    Pt is progressing and showing improvement  toward the following goals:  continuing to manage stress and anxiety using wood working as a stress reliever.  , Will continue to educate and progress as tolerated.    Psychosocial Intervention/plan:   Class: Stress and Your Health and Class: Relaxation    Psychosocial Education: signs/sxs of depression, benefits of a positive support system, and stress management techniques    Information to utilize [employer] was provided as well as contact information for counseling through  [facility] and group psychotherapy groups available.    Readiness to change: Action:  (Changing behavior)      OTHER CORE COMPONENTS GOALS and PLAN      Blood Pressure will be monitored throughout the program and cardiologist will be notified of elevated trends.    Pt will be encouraged to monitor home BP if advised by cardiologist.    Tobacco Intervention/plan:   N/A:  Pt is a  non-smoker    Progress toward Core Component goals:   Pt is progressing and showing improvement  toward the following goals:  BP WNL in rehab, medication compliance reported, continues to monitor weight at home, continues to monitor BP at home.  , Will continue to educate and progress as tolerated.    Other Core Components Intervention:   group class: Understanding Heart Disease, group class: Common Heart Medications, medication compliance, use salt substitutes, check labels for sodium content, and monitor home BP    Group and Individual Education:  understanding high blood pressure and it's relationship to CAD, components of blood pressure management, and group class: Understanding Heart Disease    Readiness to change: Action:  (Changing behavior)

## 2024-11-01 ENCOUNTER — CLINICAL SUPPORT (OUTPATIENT)
Dept: CARDIAC REHAB | Facility: HOSPITAL | Age: 79
End: 2024-11-01
Payer: COMMERCIAL

## 2024-11-01 DIAGNOSIS — I21.3 ST ELEVATION MYOCARDIAL INFARCTION (STEMI), UNSPECIFIED ARTERY (HCC): Primary | ICD-10-CM

## 2024-11-01 PROCEDURE — 93798 PHYS/QHP OP CAR RHAB W/ECG: CPT

## 2024-11-04 ENCOUNTER — CLINICAL SUPPORT (OUTPATIENT)
Dept: CARDIAC REHAB | Facility: HOSPITAL | Age: 79
End: 2024-11-04
Payer: COMMERCIAL

## 2024-11-04 DIAGNOSIS — I21.3 ST ELEVATION MYOCARDIAL INFARCTION (STEMI), UNSPECIFIED ARTERY (HCC): Primary | ICD-10-CM

## 2024-11-04 PROCEDURE — 93798 PHYS/QHP OP CAR RHAB W/ECG: CPT

## 2024-11-06 ENCOUNTER — APPOINTMENT (OUTPATIENT)
Dept: CARDIAC REHAB | Facility: HOSPITAL | Age: 79
End: 2024-11-06
Payer: COMMERCIAL

## 2024-11-08 ENCOUNTER — CLINICAL SUPPORT (OUTPATIENT)
Dept: CARDIAC REHAB | Facility: HOSPITAL | Age: 79
End: 2024-11-08
Payer: COMMERCIAL

## 2024-11-08 DIAGNOSIS — I21.3 ST ELEVATION MYOCARDIAL INFARCTION (STEMI), UNSPECIFIED ARTERY (HCC): Primary | ICD-10-CM

## 2024-11-08 PROCEDURE — 93798 PHYS/QHP OP CAR RHAB W/ECG: CPT

## 2024-11-11 ENCOUNTER — CLINICAL SUPPORT (OUTPATIENT)
Dept: CARDIAC REHAB | Facility: HOSPITAL | Age: 79
End: 2024-11-11
Payer: COMMERCIAL

## 2024-11-11 DIAGNOSIS — I21.3 ST ELEVATION MYOCARDIAL INFARCTION (STEMI), UNSPECIFIED ARTERY (HCC): Primary | ICD-10-CM

## 2024-11-11 PROCEDURE — 93798 PHYS/QHP OP CAR RHAB W/ECG: CPT

## 2024-11-13 ENCOUNTER — CLINICAL SUPPORT (OUTPATIENT)
Dept: CARDIAC REHAB | Facility: HOSPITAL | Age: 79
End: 2024-11-13
Payer: COMMERCIAL

## 2024-11-13 DIAGNOSIS — I21.3 ST ELEVATION MYOCARDIAL INFARCTION (STEMI), UNSPECIFIED ARTERY (HCC): Primary | ICD-10-CM

## 2024-11-13 PROCEDURE — 93798 PHYS/QHP OP CAR RHAB W/ECG: CPT

## 2024-11-15 ENCOUNTER — CLINICAL SUPPORT (OUTPATIENT)
Dept: CARDIAC REHAB | Facility: HOSPITAL | Age: 79
End: 2024-11-15
Payer: COMMERCIAL

## 2024-11-15 DIAGNOSIS — I21.3 ST ELEVATION MYOCARDIAL INFARCTION (STEMI), UNSPECIFIED ARTERY (HCC): Primary | ICD-10-CM

## 2024-11-15 PROCEDURE — 93798 PHYS/QHP OP CAR RHAB W/ECG: CPT

## 2024-11-18 ENCOUNTER — CLINICAL SUPPORT (OUTPATIENT)
Dept: CARDIAC REHAB | Facility: HOSPITAL | Age: 79
End: 2024-11-18
Payer: COMMERCIAL

## 2024-11-18 DIAGNOSIS — I21.3 ST ELEVATION MYOCARDIAL INFARCTION (STEMI), UNSPECIFIED ARTERY (HCC): Primary | ICD-10-CM

## 2024-11-18 PROCEDURE — 93798 PHYS/QHP OP CAR RHAB W/ECG: CPT

## 2024-11-20 ENCOUNTER — CLINICAL SUPPORT (OUTPATIENT)
Dept: CARDIAC REHAB | Facility: HOSPITAL | Age: 79
End: 2024-11-20
Payer: COMMERCIAL

## 2024-11-20 DIAGNOSIS — I21.3 ST ELEVATION MYOCARDIAL INFARCTION (STEMI), UNSPECIFIED ARTERY (HCC): Primary | ICD-10-CM

## 2024-11-20 PROCEDURE — 93798 PHYS/QHP OP CAR RHAB W/ECG: CPT

## 2024-11-22 ENCOUNTER — CLINICAL SUPPORT (OUTPATIENT)
Dept: CARDIAC REHAB | Facility: HOSPITAL | Age: 79
End: 2024-11-22
Payer: COMMERCIAL

## 2024-11-22 DIAGNOSIS — I21.3 ST ELEVATION MYOCARDIAL INFARCTION (STEMI), UNSPECIFIED ARTERY (HCC): Primary | ICD-10-CM

## 2024-11-22 PROCEDURE — 93798 PHYS/QHP OP CAR RHAB W/ECG: CPT

## 2024-11-25 ENCOUNTER — CLINICAL SUPPORT (OUTPATIENT)
Dept: CARDIAC REHAB | Facility: HOSPITAL | Age: 79
End: 2024-11-25
Payer: COMMERCIAL

## 2024-11-25 DIAGNOSIS — I21.3 ST ELEVATION MYOCARDIAL INFARCTION (STEMI), UNSPECIFIED ARTERY (HCC): Primary | ICD-10-CM

## 2024-11-25 PROCEDURE — 93798 PHYS/QHP OP CAR RHAB W/ECG: CPT

## 2024-11-27 ENCOUNTER — APPOINTMENT (OUTPATIENT)
Dept: CARDIAC REHAB | Facility: HOSPITAL | Age: 79
End: 2024-11-27
Payer: COMMERCIAL

## 2024-11-27 NOTE — PROGRESS NOTES
CARDIAC REHABILITATION   ASSESSMENT AND INDIVIDUALIZED TREATMENT PLAN  60 DAY          Today's date: 2024   # of Exercise Sessions Completed: 21  Patient name: Arturo Terry      : 1945  Age: 79 y.o.       MRN: [id number]  Referring Physician: Brenden Leggett MD ([facility]) - Dr. Villa  Cardiologist: Brenden Leggett MD ([facility]) - ITPs send to Dr. Villa  Provider: [place]  Clinician: Lashanda Quesada MS, CEP      Treatment is tailored to this patient's individual needs.  The ITP was reviewed with the patient and all questions were answered to their satisfaction.  Additional ITP documentation can be found electronically including daily and monthly exercise summaries, daily session notes with ECG summaries, education notes, daily medication reconciliation, and daily physician supervision.      Comments: Arturo has attended 21 sessions of CR in the past 60 days and is progressing well. He has no cardiac complaints at this time and reports improvement in his strength and endurance. He has been able to reach up to 3.6-4.7 METs with exercise in rehab. A light strength training component has been added to his routine at rehab. He is in agreement to continue attending CR sessions over the next 30 days.        Dx:   Encounter Diagnosis   Name Primary?    ST elevation myocardial infarction (STEMI), unspecified artery (HCC) Yes       Description of Diagnosis: ST elevation myocardial infarction of anterior wall s/p LOUIE to proximal LAD  Date of onset: 2024  Other Cardiac History: afib following MI        ASSESSMENT    Medical History:   Past Medical History:   Diagnosis Date    GERD (gastroesophageal reflux disease)     Hypertension     Ulcerative colitis (HCC)        Family History:  No family history on file.    Allergies:   Amoxicillin-pot clavulanate, Azithromycin, and Fluticasone    Current Medications:   Current Outpatient Medications   Medication Sig Dispense Refill    ALPRAZolam (XANAX) 0.5 mg tablet Take 0.5 mg by  mouth      amLODIPine (NORVASC) 5 mg tablet Take 5 mg by mouth every 24 hours      Aspirin 81 MG CAPS Take 81 mg by mouth      atorvastatin (LIPITOR) 20 mg tablet Take 20 mg by mouth daily at bedtime      calcium carbonate (TUMS EX) 750 mg chewable tablet Chew 1 tablet daily.      calcium carbonate-vitamin D (OSCAL-D) 500 mg-200 units per tablet Take 1 tablet by mouth daily with breakfast.      chlorhexidine (PERIDEX) 0.12 % solution Apply 15 mL to the mouth or throat 2 (two) times a day (Patient not taking: Reported on 9/12/2019) 120 mL 0    famotidine (PEPCID) 40 MG tablet Take 40 mg by mouth every 12 (twelve) hours  1    fluticasone-umeclidinium-vilanterol (TRELEGY ELLIPTA) 100-62.5-25 MCG/INH inhaler Inhale 1 puff daily Rinse mouth after use.      hydrocortisone (ANUSOL-HC) 2.5 % rectal cream Apply topically 2 (two) times a day prn      ipratropium (ATROVENT) 0.03 % nasal spray 2 sprays into each nostril 3 (three) times a day as needed for rhinitis 30 mL 3    mesalamine (LIALDA) 1.2 G EC tablet Take 1,200 mg by mouth daily with breakfast.      metoprolol succinate (TOPROL-XL) 25 mg 24 hr tablet Take 25 mg by mouth daily      montelukast (SINGULAIR) 10 mg tablet every 24 hours      Omega-3 Fatty Acids (FISH OIL) 1,000 mg Take 1,000 mg by mouth daily      omeprazole (PriLOSEC) 40 MG capsule Take 40 mg by mouth daily.      oxyCODONE-acetaminophen (PERCOCET) 5-325 mg per tablet Take by mouth      pantoprazole (PROTONIX) 40 mg tablet Take 40 mg by mouth daily      polyethylene glycol (MIRALAX) 17 g packet Take 17 g by mouth daily      sertraline (ZOLOFT) 50 mg tablet Take 100 mg by mouth daily      vitamin E, tocopherol, 400 units capsule Take 400 Units by mouth daily.       No current facility-administered medications for this visit.       Medication compliance: Yes   Comments: Pt reports to be compliant with medications    Physical Limitations: occasional knee pain going up stairs    Fall Risk: Low   Comments:  Ambulates with a steady gait with no assist device    Cultural needs: none      CAD Risk Factors:  Cholesterol: Yes  HTN: Yes  DM: No  Obesity: No   Inactivity: Yes      EXERCISE ASSESSMENT:     Initial Fitness Assessment: Submaximal TM ETT:  Resting:  BP: 114/72  HR: 80, Exercise:  BP: 136/76  HR: 94, METs:  4.3, ECG Summary: NSR, and Test terminated at:  RPE 6      ECG INTERPRETATION:  NSR    Current Functional Status:  Occupation: retired  Recreation/Physical Activity: wood working, yard work  ADL’s:Capable of performing light to moderate ADLs  [place]: Capable of performing light to moderate ADLs  Home exercise: none  Other Comments: has stationary bike at home      SMART Exercise Goals:   10% improvement in functional capacity based on max METs achieved in initial fitness assessment  improved DASI score by 10%  increased exercise capacity by 40% based on peak METs tolerated in cardiac rehab exercise session  maintain > 150 minutes per week of moderate intensity exercise    Patient Specific EXERCISE GOALS:       Get back to doing yard work (raking leaves, etc)  Return to riding bike next spring  Use stationary bike at home    Functional Capacity Screening Tool:  [facility] Activity Status Index:  4.73 METs      PSYCHOSOCIAL ASSESSMENT:    Date of last Assessment:  10/4/2024  Depression screening:  PHQ-9 = 1    Interpretation:  1-4 = Minimal Depression  Anxiety screening:  NEEL-7 = 1    Interpretation: 0-4  = Not anxious    Pt self-report of depression and anxiety   Patient has a history of anxiety   Reports sufficient emotional support     Self-reported stress level:  3   Stressors:  managing current health condition and wives health as well  Stress Management Tools:  woodworking    SMART Psychosocial Goals:     Physical Fitness in [facility] Score < 3    Patient Specific PSYCHOCOSOCIAL GOALS:    Continue to manage stress/anxiety  Continue wood working as a stress reliever    Quality of Life Screen:  (Higher score  "indicates disease impact on QOL)  [facility] COOP score: 14/45     Social Support:   children  Community/Social Activities: spending time with family, wood working with a friend     Psychosocial Assessment as it relates to rehabilitation:   Patient denies issues with his/her family or home life that may affect their rehabilitation efforts.       NUTRITION ASSESSMENT:    Initial Weight:  179.2 lbs  Current Weight: 180.6 lbs    Height:   Ht Readings from Last 1 Encounters:   08/02/24 6' (1.829 m)       Rate Your Plate Score: 56/81    Diabetes: N/A  A1c: no labs in chart   last measured: n/a    Lipid management:  no labs in chart    Current Dietary Habits:  \"Fair diet\". Watches sodium intake, eats a lot of fruits, could eat more vegetables, drinks two 24 oz bottles of water/day    SMART Nutrition Goals:   Improved Rate Your Plate score  >58    Patient Specific NUTRITION GOALS:     1. Utilize meal plan service that delivers fresh ingredients ready to use in a meal   3. Increase vegetable intake    Drug/Alcohol Use:   N/A      OTHER CORE COMPONENT ASSESSMENT:    Tobacco Use:     N/A:  Patient is a non-smoker     Anginal Symptoms:   chest pain   NTG use: No prescription    SMART Goals:   consistent, controlled resting BP < 130/80 and medication compliance    Patient Specific CORE COMPONENT GOALS:    Continue to check weight weekly at home and reports any abnormal changes  Continue to monitor BP at home and reports any abnormal trends      INDIVIDUALIZED TREATMENT PLAN      EXERCISE GOALS and PLAN      Progress toward Exercise goals:   Pt is progressing and showing improvement  toward the following goals:  increasing exercise capacity in rehab as tolerated, maintaining >150 min/wk of moderate intensity exercise, is back to doing yard work such as raking leaves without issue, has not been back to riding his bike yet however plans to do so this upcoming spring.  , Will continue to educate and progress as " tolerated.    Exercise Intervention/plan:    education on home exercise guidelines, home exercise 30+ mins 2 days opposite CR, Group class: Risk Factors for Heart Disease, and Patient education:   Exercise After Cardiac Rehabilitation    The patient was counseled on exercise guidelines to achieve a minimum of 150 mins/wk of moderate intensity (RPE 4-6)   exercise and encouraged to add 1-2 days of exercise on opposite days of cardiac rehab as tolerated.       PHYSICIAN PRESCRIBED EXERCISE:    Current Aerobic Exercise Prescription:      Frequency: 3 days/week   Supplement with home exercise 2+ days/wk as tolerated       Minutes: 40-50         METS: 3.6-4.7            HR: RHR +30-40bpm   RPE: 4-6         Modalities: Treadmill, UBE, Lifecycle, NuStep, and Recumbent bike     Exercise workloads will be progressed gradually as tolerated, within limits of patient's ability, and according to the patient's   response to the exercise program.      Aerobic Exercise Prescription Plan for Progression   Frequency: 3 days/week of cardiac rehab       Supplement with home exercise 2+ days/wk as tolerated    Minutes: 40-55       >150 mins/wk of moderate intensity exercise   METS: 3.6 - 5.0   HR: RHR +30-40bpm     RPE: 4-6   Modalities: Treadmill, UBE, Lifecycle, Rower, NuStep, and Recumbent bike    Strength trainin-3 days / week  12-15 repetitions  1-2 sets per modality    Modalities: Pull Downs, Lateral Raise, Arm Extension, Arm Curl, and leg extensions    Home Exercise: none    Exercise Education: benefit of exercise for CAD risk factors, home exercise guidelines, AHA guidelines to achieve >150 mins/wk of moderate exercise, RPE scale, and Group class: Risk Factors for Heart Disease     Readiness to change: Action:  (Changing behavior)      NUTRITION GOALS AND PLAN      Nutritional   Reviewed patient's Rate your Plate. Discussed key elements of heart healthy eating. Reviewed patient goals for dietary modifications and their  clinical implications.  Reviewed most recent lipid profile.     Patient's progress toward Nutrition goals:    Pt is progressing and showing improvement  toward the following goals:  continues to utilize meal plan service, increasing vegetable intake as tolerated.  , Will continue to educate and progress as tolerated.      Nutrition Intervention/plan:   group class: Reading Food Labels and group Class: Heart Healthy Eating    Measurable goals were based Rate Your Plate Dietary Self-Assessment. These are the areas in which the patient could score higher on the assessment.  Goals include recommendations for a heart healthy diet based on American Heart Association.    Nutrition Education:   heart healthy eating principles  low sodium diet  maintaining hydration  nutrition for  lipid management    Readiness to change: Action:  (Changing behavior)      PSYCHOSOCIAL GOALS AND PLAN    Psychosocial Assessment as it relates to rehabilitation:   Patient denies issues with his/her family or home life that may affect their rehabilitation efforts.     Patient's progress toward Psychosocial goals:    Pt is progressing and showing improvement  toward the following goals:  continuing to manage stress and anxiety using wood working as a stress reliever.  , Will continue to educate and progress as tolerated.    Psychosocial Intervention/plan:   Class: Stress and Your Health and Class: Relaxation    Psychosocial Education: signs/sxs of depression, benefits of a positive support system, and stress management techniques    Information to utilize [employer] was provided as well as contact information for counseling through  [facility] and group psychotherapy groups available.    Readiness to change: Action:  (Changing behavior)      OTHER CORE COMPONENTS GOALS and PLAN      Blood Pressure will be monitored throughout the program and cardiologist will be notified of elevated trends.    Pt will be encouraged to monitor home BP if  advised by cardiologist.    Tobacco Intervention/plan:   N/A:  Pt is a non-smoker    Progress toward Core Component goals:   Pt is progressing and showing improvement  toward the following goals:  BP WNL in rehab, medication compliance reported, continues to monitor weight at home, continues to monitor BP at home.  , Will continue to educate and progress as tolerated.    Other Core Components Intervention:   group class: Understanding Heart Disease, group class: Common Heart Medications, medication compliance, use salt substitutes, check labels for sodium content, and monitor home BP    Group and Individual Education:  understanding high blood pressure and it's relationship to CAD, components of blood pressure management, and group class: Understanding Heart Disease    Readiness to change: Action:  (Changing behavior)

## 2024-11-29 ENCOUNTER — APPOINTMENT (OUTPATIENT)
Dept: CARDIAC REHAB | Facility: HOSPITAL | Age: 79
End: 2024-11-29
Payer: COMMERCIAL

## 2024-12-02 ENCOUNTER — CLINICAL SUPPORT (OUTPATIENT)
Dept: CARDIAC REHAB | Facility: HOSPITAL | Age: 79
End: 2024-12-02
Payer: COMMERCIAL

## 2024-12-02 ENCOUNTER — APPOINTMENT (OUTPATIENT)
Dept: CARDIAC REHAB | Facility: HOSPITAL | Age: 79
End: 2024-12-02
Payer: COMMERCIAL

## 2024-12-02 DIAGNOSIS — I21.3 ST ELEVATION MYOCARDIAL INFARCTION (STEMI), UNSPECIFIED ARTERY (HCC): Primary | ICD-10-CM

## 2024-12-02 PROCEDURE — 93798 PHYS/QHP OP CAR RHAB W/ECG: CPT

## 2024-12-03 ENCOUNTER — APPOINTMENT (OUTPATIENT)
Dept: CARDIAC REHAB | Facility: HOSPITAL | Age: 79
End: 2024-12-03
Payer: COMMERCIAL

## 2024-12-04 ENCOUNTER — APPOINTMENT (OUTPATIENT)
Dept: CARDIAC REHAB | Facility: HOSPITAL | Age: 79
End: 2024-12-04
Payer: COMMERCIAL

## 2024-12-04 ENCOUNTER — CLINICAL SUPPORT (OUTPATIENT)
Dept: CARDIAC REHAB | Facility: HOSPITAL | Age: 79
End: 2024-12-04
Payer: COMMERCIAL

## 2024-12-04 DIAGNOSIS — I21.3 ST ELEVATION MYOCARDIAL INFARCTION (STEMI), UNSPECIFIED ARTERY (HCC): Primary | ICD-10-CM

## 2024-12-04 PROCEDURE — 93798 PHYS/QHP OP CAR RHAB W/ECG: CPT

## 2024-12-05 ENCOUNTER — APPOINTMENT (OUTPATIENT)
Dept: CARDIAC REHAB | Facility: HOSPITAL | Age: 79
End: 2024-12-05
Payer: COMMERCIAL

## 2024-12-06 ENCOUNTER — CLINICAL SUPPORT (OUTPATIENT)
Dept: CARDIAC REHAB | Facility: HOSPITAL | Age: 79
End: 2024-12-06
Payer: COMMERCIAL

## 2024-12-06 DIAGNOSIS — I21.3 ST ELEVATION MYOCARDIAL INFARCTION (STEMI), UNSPECIFIED ARTERY (HCC): Primary | ICD-10-CM

## 2024-12-06 PROCEDURE — 93798 PHYS/QHP OP CAR RHAB W/ECG: CPT

## 2024-12-09 ENCOUNTER — APPOINTMENT (OUTPATIENT)
Dept: CARDIAC REHAB | Facility: HOSPITAL | Age: 79
End: 2024-12-09
Payer: COMMERCIAL

## 2024-12-11 ENCOUNTER — CLINICAL SUPPORT (OUTPATIENT)
Dept: CARDIAC REHAB | Facility: HOSPITAL | Age: 79
End: 2024-12-11
Payer: COMMERCIAL

## 2024-12-11 DIAGNOSIS — I21.3 ST ELEVATION MYOCARDIAL INFARCTION (STEMI), UNSPECIFIED ARTERY (HCC): Primary | ICD-10-CM

## 2024-12-11 PROCEDURE — 93798 PHYS/QHP OP CAR RHAB W/ECG: CPT

## 2024-12-11 NOTE — PROGRESS NOTES
Exercise session detail attached.  Low BP at recovery. Reported lightheadedness and blurry vision. Hydrated 16 oz water. Sat with feet elevated. BP came up to 102/62 after 30 min of rest. Pt denied any lightheadedness, blurry vision , or other symptoms of low BP. Pt provided water for car ride home

## 2024-12-13 ENCOUNTER — CLINICAL SUPPORT (OUTPATIENT)
Dept: CARDIAC REHAB | Facility: HOSPITAL | Age: 79
End: 2024-12-13
Payer: COMMERCIAL

## 2024-12-13 DIAGNOSIS — I21.3 ST ELEVATION MYOCARDIAL INFARCTION (STEMI), UNSPECIFIED ARTERY (HCC): Primary | ICD-10-CM

## 2024-12-13 PROCEDURE — 93798 PHYS/QHP OP CAR RHAB W/ECG: CPT

## 2024-12-16 ENCOUNTER — CLINICAL SUPPORT (OUTPATIENT)
Dept: CARDIAC REHAB | Facility: HOSPITAL | Age: 79
End: 2024-12-16
Payer: COMMERCIAL

## 2024-12-16 DIAGNOSIS — I21.3 ST ELEVATION MYOCARDIAL INFARCTION (STEMI), UNSPECIFIED ARTERY (HCC): Primary | ICD-10-CM

## 2024-12-16 PROCEDURE — 93798 PHYS/QHP OP CAR RHAB W/ECG: CPT

## 2024-12-18 ENCOUNTER — CLINICAL SUPPORT (OUTPATIENT)
Dept: CARDIAC REHAB | Facility: HOSPITAL | Age: 79
End: 2024-12-18
Payer: COMMERCIAL

## 2024-12-18 DIAGNOSIS — I21.3 ST ELEVATION MYOCARDIAL INFARCTION (STEMI), UNSPECIFIED ARTERY (HCC): Primary | ICD-10-CM

## 2024-12-18 PROCEDURE — 93798 PHYS/QHP OP CAR RHAB W/ECG: CPT

## 2024-12-20 ENCOUNTER — CLINICAL SUPPORT (OUTPATIENT)
Dept: CARDIAC REHAB | Facility: HOSPITAL | Age: 79
End: 2024-12-20
Payer: COMMERCIAL

## 2024-12-20 DIAGNOSIS — I21.3 ST ELEVATION MYOCARDIAL INFARCTION (STEMI), UNSPECIFIED ARTERY (HCC): Primary | ICD-10-CM

## 2024-12-20 PROCEDURE — 93798 PHYS/QHP OP CAR RHAB W/ECG: CPT

## 2024-12-23 ENCOUNTER — CLINICAL SUPPORT (OUTPATIENT)
Dept: CARDIAC REHAB | Facility: HOSPITAL | Age: 79
End: 2024-12-23
Payer: COMMERCIAL

## 2024-12-23 DIAGNOSIS — I21.3 ST ELEVATION MYOCARDIAL INFARCTION (STEMI), UNSPECIFIED ARTERY (HCC): Primary | ICD-10-CM

## 2024-12-23 PROCEDURE — 93798 PHYS/QHP OP CAR RHAB W/ECG: CPT

## 2024-12-26 NOTE — PROGRESS NOTES
CARDIAC REHABILITATION   ASSESSMENT AND INDIVIDUALIZED TREATMENT PLAN  90 DAY          Today's date: 2024   # of Exercise Sessions Completed: 30  Patient name: Arturo Terry      : 1945  Age: 79 y.o.       MRN: [id number]  Referring Physician: Brenden Leggett MD ([facility]) - Dr. Villa  Cardiologist: Brenden Leggett MD ([facility]) - ITPs send to Dr. Villa  Provider: [place]  Clinician: Palma Norris MS, CEP      Treatment is tailored to this patient's individual needs.  The ITP was reviewed with the patient and all questions were answered to their satisfaction.  Additional ITP documentation can be found electronically including daily and monthly exercise summaries, daily session notes with ECG summaries, education notes, daily medication reconciliation, and daily physician supervision.      Comments: Arturo has attended 30 sessions of CR in the past 90 days and is progressing well. He has no cardiac complaints at this time and reports improvement in his strength and endurance. He has been able to reach up to 3.6-4.8 METs with exercise in rehab. A light strength training component has been added to his routine at rehab. He is in agreement to continue attending CR sessions over the next 30 days.        Dx:   Encounter Diagnosis   Name Primary?    ST elevation myocardial infarction (STEMI), unspecified artery (HCC) Yes       Description of Diagnosis: ST elevation myocardial infarction of anterior wall s/p LOUIE to proximal LAD  Date of onset: 2024  Other Cardiac History: afib following MI        ASSESSMENT    Medical History:   Past Medical History:   Diagnosis Date    GERD (gastroesophageal reflux disease)     Hypertension     Ulcerative colitis (HCC)        Family History:  No family history on file.    Allergies:   Amoxicillin-pot clavulanate, Azithromycin, and Fluticasone    Current Medications:   Current Outpatient Medications   Medication Sig Dispense Refill    ALPRAZolam (XANAX) 0.5 mg tablet Take 0.5 mg by  mouth      amLODIPine (NORVASC) 5 mg tablet Take 5 mg by mouth every 24 hours      Aspirin 81 MG CAPS Take 81 mg by mouth      atorvastatin (LIPITOR) 20 mg tablet Take 20 mg by mouth daily at bedtime      calcium carbonate (TUMS EX) 750 mg chewable tablet Chew 1 tablet daily.      calcium carbonate-vitamin D (OSCAL-D) 500 mg-200 units per tablet Take 1 tablet by mouth daily with breakfast.      chlorhexidine (PERIDEX) 0.12 % solution Apply 15 mL to the mouth or throat 2 (two) times a day (Patient not taking: Reported on 9/12/2019) 120 mL 0    famotidine (PEPCID) 40 MG tablet Take 40 mg by mouth every 12 (twelve) hours  1    fluticasone-umeclidinium-vilanterol (TRELEGY ELLIPTA) 100-62.5-25 MCG/INH inhaler Inhale 1 puff daily Rinse mouth after use.      hydrocortisone (ANUSOL-HC) 2.5 % rectal cream Apply topically 2 (two) times a day prn      ipratropium (ATROVENT) 0.03 % nasal spray 2 sprays into each nostril 3 (three) times a day as needed for rhinitis 30 mL 3    mesalamine (LIALDA) 1.2 G EC tablet Take 1,200 mg by mouth daily with breakfast.      metoprolol succinate (TOPROL-XL) 25 mg 24 hr tablet Take 25 mg by mouth daily      montelukast (SINGULAIR) 10 mg tablet every 24 hours      Omega-3 Fatty Acids (FISH OIL) 1,000 mg Take 1,000 mg by mouth daily      omeprazole (PriLOSEC) 40 MG capsule Take 40 mg by mouth daily.      oxyCODONE-acetaminophen (PERCOCET) 5-325 mg per tablet Take by mouth      pantoprazole (PROTONIX) 40 mg tablet Take 40 mg by mouth daily      polyethylene glycol (MIRALAX) 17 g packet Take 17 g by mouth daily      sertraline (ZOLOFT) 50 mg tablet Take 100 mg by mouth daily      vitamin E, tocopherol, 400 units capsule Take 400 Units by mouth daily.       No current facility-administered medications for this visit.       Medication compliance: Yes   Comments: Pt reports to be compliant with medications    Physical Limitations: occasional knee pain going up stairs    Fall Risk: Low   Comments:  Ambulates with a steady gait with no assist device    Cultural needs: none      CAD Risk Factors:  Cholesterol: Yes  HTN: Yes  DM: No  Obesity: No   Inactivity: Yes      EXERCISE ASSESSMENT:     Initial Fitness Assessment: Submaximal TM ETT:  Resting:  BP: 114/72  HR: 80, Exercise:  BP: 136/76  HR: 94, METs:  4.3, ECG Summary: NSR, and Test terminated at:  RPE 6      ECG INTERPRETATION:  NSR    Current Functional Status:  Occupation: retired  Recreation/Physical Activity: wood working, yard work  ADL’s:Capable of performing light to moderate ADLs  [place]: Capable of performing light to moderate ADLs  Home exercise: none  Other Comments: has stationary bike at home      SMART Exercise Goals:   10% improvement in functional capacity based on max METs achieved in initial fitness assessment  improved DASI score by 10%  increased exercise capacity by 40% based on peak METs tolerated in cardiac rehab exercise session  maintain > 150 minutes per week of moderate intensity exercise    Patient Specific EXERCISE GOALS:       Get back to doing yard work (raking leaves, etc)  Return to riding bike next spring  Use stationary bike at home    Functional Capacity Screening Tool:  [facility] Activity Status Index:  4.73 METs      PSYCHOSOCIAL ASSESSMENT:    Date of last Assessment:  10/4/2024  Depression screening:  PHQ-9 = 1    Interpretation:  1-4 = Minimal Depression  Anxiety screening:  NEEL-7 = 1    Interpretation: 0-4  = Not anxious    Pt self-report of depression and anxiety   Patient has a history of anxiety   Reports sufficient emotional support     Self-reported stress level:  3   Stressors:  managing current health condition and wives health as well  Stress Management Tools:  woodworking    SMART Psychosocial Goals:     Physical Fitness in [facility] Score < 3    Patient Specific PSYCHOCOSOCIAL GOALS:    Continue to manage stress/anxiety  Continue wood working as a stress reliever    Quality of Life Screen:  (Higher score  "indicates disease impact on QOL)  [facility] COOP score: 14/45     Social Support:   children  Community/Social Activities: spending time with family, wood working with a friend     Psychosocial Assessment as it relates to rehabilitation:   Patient denies issues with his/her family or home life that may affect their rehabilitation efforts.       NUTRITION ASSESSMENT:    Initial Weight:  179.2 lbs  Current Weight: 177.6 lbs    Height:   Ht Readings from Last 1 Encounters:   08/02/24 6' (1.829 m)       Rate Your Plate Score: 56/81    Diabetes: N/A  A1c: no labs in chart   last measured: n/a    Lipid management:  no labs in chart    Current Dietary Habits:  \"Fair diet\". Watches sodium intake, eats a lot of fruits, could eat more vegetables, drinks two 24 oz bottles of water/day    SMART Nutrition Goals:   Improved Rate Your Plate score  >58    Patient Specific NUTRITION GOALS:     1. Utilize meal plan service that delivers fresh ingredients ready to use in a meal   3. Increase vegetable intake    Drug/Alcohol Use:   N/A      OTHER CORE COMPONENT ASSESSMENT:    Tobacco Use:     N/A:  Patient is a non-smoker     Anginal Symptoms:   chest pain   NTG use: No prescription    SMART Goals:   consistent, controlled resting BP < 130/80 and medication compliance    Patient Specific CORE COMPONENT GOALS:    Continue to check weight weekly at home and reports any abnormal changes  Continue to monitor BP at home and reports any abnormal trends      INDIVIDUALIZED TREATMENT PLAN      EXERCISE GOALS and PLAN      Progress toward Exercise goals:   Pt is progressing and showing improvement  toward the following goals:  increasing exercise capacity in rehab as tolerated, maintaining >150 min/wk of moderate intensity exercise, is back to doing yard work such as raking leaves without issue, has not been back to riding his bike yet however plans to do so this upcoming spring.  , Will continue to educate and progress as " tolerated.    Exercise Intervention/plan:    education on home exercise guidelines, home exercise 30+ mins 2 days opposite CR, Group class: Risk Factors for Heart Disease, and Patient education:   Exercise After Cardiac Rehabilitation    The patient was counseled on exercise guidelines to achieve a minimum of 150 mins/wk of moderate intensity (RPE 4-6)   exercise and encouraged to add 1-2 days of exercise on opposite days of cardiac rehab as tolerated.       PHYSICIAN PRESCRIBED EXERCISE:    Current Aerobic Exercise Prescription:      Frequency: 3 days/week   Supplement with home exercise 2+ days/wk as tolerated       Minutes: 40-50         METS: 3.6-4.8           HR: RHR +30-40bpm   RPE: 4-6         Modalities: Treadmill, UBE, Lifecycle, Rower, NuStep, and Recumbent bike     Exercise workloads will be progressed gradually as tolerated, within limits of patient's ability, and according to the patient's   response to the exercise program.      Aerobic Exercise Prescription Plan for Progression   Frequency: 3 days/week of cardiac rehab       Supplement with home exercise 2+ days/wk as tolerated    Minutes: 40-55       >150 mins/wk of moderate intensity exercise   METS: 3.6 - 5.0   HR: RHR +30-40bpm     RPE: 4-6   Modalities: Treadmill, UBE, Lifecycle, Rower, NuStep, and Recumbent bike    Strength trainin-3 days / week  12-15 repetitions  1-2 sets per modality    Modalities: Pull Downs, Lateral Raise, Arm Extension, Arm Curl, and leg extensions    Home Exercise: none    Exercise Education: benefit of exercise for CAD risk factors, home exercise guidelines, AHA guidelines to achieve >150 mins/wk of moderate exercise, RPE scale, and Group class: Risk Factors for Heart Disease     Readiness to change: Action:  (Changing behavior)      NUTRITION GOALS AND PLAN      Nutritional   Reviewed patient's Rate your Plate. Discussed key elements of heart healthy eating. Reviewed patient goals for dietary modifications and  their clinical implications.  Reviewed most recent lipid profile.     Patient's progress toward Nutrition goals:    Pt is progressing and showing improvement  toward the following goals:  continues to utilize meal plan service, increasing vegetable intake as tolerated.  , Will continue to educate and progress as tolerated.      Nutrition Intervention/plan:   group class: Reading Food Labels and group Class: Heart Healthy Eating    Measurable goals were based Rate Your Plate Dietary Self-Assessment. These are the areas in which the patient could score higher on the assessment.  Goals include recommendations for a heart healthy diet based on American Heart Association.    Nutrition Education:   heart healthy eating principles  low sodium diet  maintaining hydration  nutrition for  lipid management  group class: Heart Healthy Eating  group class:  Label Reading    Readiness to change: Action:  (Changing behavior)      PSYCHOSOCIAL GOALS AND PLAN    Psychosocial Assessment as it relates to rehabilitation:   Patient denies issues with his/her family or home life that may affect their rehabilitation efforts.     Patient's progress toward Psychosocial goals:    Pt is progressing and showing improvement  toward the following goals:  continuing to manage stress and anxiety using wood working as a stress reliever.  , Will continue to educate and progress as tolerated.    Psychosocial Intervention/plan:   Class: Stress and Your Health and Class: Relaxation    Psychosocial Education: signs/sxs of depression, benefits of a positive support system, and stress management techniques    Information to utilize [employer] was provided as well as contact information for counseling through  Behavioral [facility] and group psychotherapy groups available.    Readiness to change: Action:  (Changing behavior)      OTHER CORE COMPONENTS GOALS and PLAN      Blood Pressure will be monitored throughout the program and cardiologist will be  notified of elevated trends.    Pt will be encouraged to monitor home BP if advised by cardiologist.    Tobacco Intervention/plan:   N/A:  Pt is a non-smoker    Progress toward Core Component goals:   Pt is progressing and showing improvement  toward the following goals:  BP WNL in rehab, medication compliance reported, continues to monitor weight at home, continues to monitor BP at home.  , Will continue to educate and progress as tolerated.    Other Core Components Intervention:   group class: Understanding Heart Disease, group class: Common Heart Medications, medication compliance, use salt substitutes, check labels for sodium content, and monitor home BP    Group and Individual Education:  understanding high blood pressure and it's relationship to CAD, components of blood pressure management, group class: Understanding Heart Disease, and group class:  Common Heart Medications    Readiness to change: Action:  (Changing behavior)

## 2024-12-27 ENCOUNTER — CLINICAL SUPPORT (OUTPATIENT)
Dept: CARDIAC REHAB | Facility: HOSPITAL | Age: 79
End: 2024-12-27
Payer: COMMERCIAL

## 2024-12-27 DIAGNOSIS — I21.3 ST ELEVATION MYOCARDIAL INFARCTION (STEMI), UNSPECIFIED ARTERY (HCC): Primary | ICD-10-CM

## 2024-12-27 PROCEDURE — 93798 PHYS/QHP OP CAR RHAB W/ECG: CPT

## 2024-12-30 ENCOUNTER — CLINICAL SUPPORT (OUTPATIENT)
Dept: CARDIAC REHAB | Facility: HOSPITAL | Age: 79
End: 2024-12-30
Payer: COMMERCIAL

## 2024-12-30 DIAGNOSIS — I21.3 ST ELEVATION MYOCARDIAL INFARCTION (STEMI), UNSPECIFIED ARTERY (HCC): Primary | ICD-10-CM

## 2024-12-30 PROCEDURE — 93798 PHYS/QHP OP CAR RHAB W/ECG: CPT

## 2025-01-03 ENCOUNTER — CLINICAL SUPPORT (OUTPATIENT)
Dept: CARDIAC REHAB | Facility: HOSPITAL | Age: 80
End: 2025-01-03
Payer: COMMERCIAL

## 2025-01-03 DIAGNOSIS — I21.3 ST ELEVATION MYOCARDIAL INFARCTION (STEMI), UNSPECIFIED ARTERY (HCC): Primary | ICD-10-CM

## 2025-01-03 PROCEDURE — 93798 PHYS/QHP OP CAR RHAB W/ECG: CPT

## 2025-01-06 ENCOUNTER — CLINICAL SUPPORT (OUTPATIENT)
Dept: CARDIAC REHAB | Facility: HOSPITAL | Age: 80
End: 2025-01-06
Payer: COMMERCIAL

## 2025-01-06 DIAGNOSIS — I21.3 ST ELEVATION MYOCARDIAL INFARCTION (STEMI), UNSPECIFIED ARTERY (HCC): Primary | ICD-10-CM

## 2025-01-06 PROCEDURE — 93798 PHYS/QHP OP CAR RHAB W/ECG: CPT

## 2025-01-08 ENCOUNTER — CLINICAL SUPPORT (OUTPATIENT)
Dept: CARDIAC REHAB | Facility: HOSPITAL | Age: 80
End: 2025-01-08
Payer: COMMERCIAL

## 2025-01-08 DIAGNOSIS — I21.3 ST ELEVATION MYOCARDIAL INFARCTION (STEMI), UNSPECIFIED ARTERY (HCC): Primary | ICD-10-CM

## 2025-01-08 PROCEDURE — 93798 PHYS/QHP OP CAR RHAB W/ECG: CPT

## 2025-01-10 ENCOUNTER — CLINICAL SUPPORT (OUTPATIENT)
Dept: CARDIAC REHAB | Facility: HOSPITAL | Age: 80
End: 2025-01-10
Payer: COMMERCIAL

## 2025-01-10 DIAGNOSIS — I21.3 ST ELEVATION MYOCARDIAL INFARCTION (STEMI), UNSPECIFIED ARTERY (HCC): Primary | ICD-10-CM

## 2025-01-10 PROCEDURE — 93798 PHYS/QHP OP CAR RHAB W/ECG: CPT

## 2025-01-10 NOTE — PROGRESS NOTES
CARDIAC REHABILITATION   ASSESSMENT AND INDIVIDUALIZED TREATMENT PLAN  DISCHARGE            Today's date: 1/10/2025   # of Exercise Sessions Completed: 36  Patient name: Arturo Terry      : 1945  Age: 79 y.o.       MRN: [id number]  Referring Physician: Brenden Leggett MD ([facility]) - Dr. Villa  Cardiologist: Brenden Leggett MD ([facility]) - ITPs send to Dr. Villa  Provider: [place]  Clinician: Palma Norris MS, CEP      Treatment is tailored to this patient's individual needs.  The ITP was reviewed with the patient and all questions were answered to their satisfaction.  Additional ITP documentation can be found electronically including daily and monthly exercise summaries, daily session notes with ECG summaries, education notes, daily medication reconciliation, and daily physician supervision.      Comments: Arturo has attended 36 sessions of cardiac rehab and is being discharged at this time. No cardiac complaints at this time. Arturo reports increased strength and endurance, however still reports low energy some days. Arturo plans to attend the local [facility] utilizing his Silver [employer]neakers to continue exercise. He hopes to return to riding his bike this spring when the weather improves. Arturo experienced occasional moments of symptomatic low BP following exercise. He has been encouraged to keep up with hydration. His cardiologist also had him change when he takes his BP medication to at night instead of the morning. Arturo showed improvement in submax ETT, [facility] QOL questionnaires, DASI questionnaire, and RYP survey.         Dx:   Encounter Diagnosis   Name Primary?    ST elevation myocardial infarction (STEMI), unspecified artery (HCC) Yes       Description of Diagnosis: ST elevation myocardial infarction of anterior wall s/p LOUIE to proximal LAD  Date of onset: 2024  Other Cardiac History: afib following MI        ASSESSMENT    Medical History:   Past Medical History:   Diagnosis Date    GERD (gastroesophageal  reflux disease)     Hypertension     Ulcerative colitis (HCC)        Family History:  No family history on file.    Allergies:   Amoxicillin-pot clavulanate, Azithromycin, and Fluticasone    Current Medications:   Current Outpatient Medications   Medication Sig Dispense Refill    ALPRAZolam (XANAX) 0.5 mg tablet Take 0.5 mg by mouth      amLODIPine (NORVASC) 5 mg tablet Take 5 mg by mouth every 24 hours      Aspirin 81 MG CAPS Take 81 mg by mouth      atorvastatin (LIPITOR) 20 mg tablet Take 20 mg by mouth daily at bedtime      calcium carbonate (TUMS EX) 750 mg chewable tablet Chew 1 tablet daily.      calcium carbonate-vitamin D (OSCAL-D) 500 mg-200 units per tablet Take 1 tablet by mouth daily with breakfast.      chlorhexidine (PERIDEX) 0.12 % solution Apply 15 mL to the mouth or throat 2 (two) times a day (Patient not taking: Reported on 9/12/2019) 120 mL 0    famotidine (PEPCID) 40 MG tablet Take 40 mg by mouth every 12 (twelve) hours  1    fluticasone-umeclidinium-vilanterol (TRELEGY ELLIPTA) 100-62.5-25 MCG/INH inhaler Inhale 1 puff daily Rinse mouth after use.      hydrocortisone (ANUSOL-HC) 2.5 % rectal cream Apply topically 2 (two) times a day prn      ipratropium (ATROVENT) 0.03 % nasal spray 2 sprays into each nostril 3 (three) times a day as needed for rhinitis 30 mL 3    mesalamine (LIALDA) 1.2 G EC tablet Take 1,200 mg by mouth daily with breakfast.      metoprolol succinate (TOPROL-XL) 25 mg 24 hr tablet Take 25 mg by mouth daily      montelukast (SINGULAIR) 10 mg tablet every 24 hours      Omega-3 Fatty Acids (FISH OIL) 1,000 mg Take 1,000 mg by mouth daily      omeprazole (PriLOSEC) 40 MG capsule Take 40 mg by mouth daily.      oxyCODONE-acetaminophen (PERCOCET) 5-325 mg per tablet Take by mouth      pantoprazole (PROTONIX) 40 mg tablet Take 40 mg by mouth daily      polyethylene glycol (MIRALAX) 17 g packet Take 17 g by mouth daily      sertraline (ZOLOFT) 50 mg tablet Take 100 mg by mouth daily       vitamin E, tocopherol, 400 units capsule Take 400 Units by mouth daily.       No current facility-administered medications for this visit.       Medication compliance: Yes   Comments: Pt reports to be compliant with medications    Physical Limitations: occasional knee pain going up stairs    Fall Risk: Low   Comments: Ambulates with a steady gait with no assist device    Cultural needs: none      CAD Risk Factors:  Cholesterol: Yes  HTN: Yes  DM: No  Obesity: No   Inactivity: Yes      EXERCISE ASSESSMENT:     Discharge Fitness Assessment: Submaximal TM ETT:  Resting:  BP: 124/74  HR: 85, Exercise:  BP: 138/78  HR: 98, METs:  5.8, ECG Summary: NSR, and Test terminated at:  RPE 6      ECG INTERPRETATION:  NSR    Current Functional Status:  Occupation: retired  Recreation/Physical Activity: wood working, yard work  ADL’s:Capable of performing light to moderate ADLs  [place]: Capable of performing light to moderate ADLs  Home exercise: none  Other Comments: has stationary bike at home      SMART Exercise Goals:   10% improvement in functional capacity based on max METs achieved in initial fitness assessment  improved DASI score by 10%  increased exercise capacity by 40% based on peak METs tolerated in cardiac rehab exercise session  maintain > 150 minutes per week of moderate intensity exercise    Patient Specific EXERCISE GOALS:       Get back to doing yard work (raking leaves, etc)  Return to riding bike next spring  Use stationary bike at home    Functional Capacity Screening Tool:  [facility] Activity Status Index:  7.99 METs      PSYCHOSOCIAL ASSESSMENT:    Date of last Assessment:  1/10/2025  Depression screening:  PHQ-9 = 2  Interpretation:  1-4 = Minimal Depression  Anxiety screening:  NEEL-7 = 0   Interpretation: 0-4  = Not anxious    Pt self-report of depression and anxiety   Patient has a history of anxiety   Reports sufficient emotional support     Self-reported stress level:  3   Stressors:  managing  "current health condition and wives health as well  Stress Management Tools:  woodworking    SMART Psychosocial Goals:     Physical Fitness in [facility] Score < 3    Patient Specific PSYCHOCOSOCIAL GOALS:    Continue to manage stress/anxiety  Continue wood working as a stress reliever    Quality of Life Screen:  (Higher score indicates disease impact on QOL)  [facility] COOP score: 14/45     Social Support:   children  Community/Social Activities: spending time with family, wood working with a friend     Psychosocial Assessment as it relates to rehabilitation:   Patient denies issues with his/her family or home life that may affect their rehabilitation efforts.       NUTRITION ASSESSMENT:    Initial Weight:  179.2 lbs  Current Weight: 176.2 lbs    Height:   Ht Readings from Last 1 Encounters:   08/02/24 6' (1.829 m)       Rate Your Plate Score: 67/81    Diabetes: N/A  A1c: no labs in chart   last measured: n/a    Lipid management:  no labs in chart    Current Dietary Habits:  \"Fair diet\". Watches sodium intake, eats a lot of fruits, could eat more vegetables, drinks two 24 oz bottles of water/day    SMART Nutrition Goals:   Improved Rate Your Plate score  >58    Patient Specific NUTRITION GOALS:     1. Utilize meal plan service that delivers fresh ingredients ready to use in a meal   3. Increase vegetable intake    Drug/Alcohol Use:   N/A      OTHER CORE COMPONENT ASSESSMENT:    Tobacco Use:     N/A:  Patient is a non-smoker     Anginal Symptoms:   chest pain   NTG use: No prescription    SMART Goals:   consistent, controlled resting BP < 130/80 and medication compliance    Patient Specific CORE COMPONENT GOALS:    Continue to check weight weekly at home and reports any abnormal changes  Continue to monitor BP at home and reports any abnormal trends      INDIVIDUALIZED TREATMENT PLAN      EXERCISE GOALS and PLAN      Progress toward Exercise goals:   Goals met: 34.9% improvement in max METs achieved in initial fitness " assessment, DASI score iproved by 68.9%, exercise capacity in rehab increased by 17%, maintaining >50 min/wk of moderate intensity exercise., Patient will be encouraged to focus on lifestyle modifications following discharge., plans to return to yard work and riding bike in the spring when weather improves    Exercise Intervention/plan:    After discharge patient will continue to follow a regular exercise regimen with the goal of a minimum of 150 minutes per week of moderate intensity exercise.      The patient was counseled on exercise guidelines to achieve a minimum of 150 mins/wk of moderate intensity (RPE 4-6)   exercise and encouraged to add 1-2 days of exercise on opposite days of cardiac rehab as tolerated.       PHYSICIAN PRESCRIBED EXERCISE:    Current Aerobic Exercise Prescription:      Frequency: 3 days/week   Supplement with home exercise 2+ days/wk as tolerated       Minutes: 40-50         METS: 3.6-4.8           HR: RHR +30-40bpm   RPE: 4-6         Modalities: Treadmill, UBE, Lifecycle, Rower, NuStep, and Recumbent bike     Exercise workloads will be progressed gradually as tolerated, within limits of patient's ability, and according to the patient's   response to the exercise program.      Strength trainin-3 days / week  12-15 repetitions  1-2 sets per modality    Modalities: Pull Downs, Lateral Raise, Arm Extension, Arm Curl, and leg extensions    Home Exercise: none    Exercise Education: benefit of exercise for CAD risk factors, home exercise guidelines, AHA guidelines to achieve >150 mins/wk of moderate exercise, RPE scale, Group class: Risk Factors for Heart Disease, and exercise instructions/guidelines for discharge      Readiness to change: Maintenance: (Maintaining the behavior change)      NUTRITION GOALS AND PLAN      Nutritional   Reviewed patient's Rate your Plate. Discussed key elements of heart healthy eating. Reviewed patient goals for dietary modifications and their clinical  implications.  Reviewed most recent lipid profile.     Patient's progress toward Nutrition goals:    Goals met: reports making dietary changes to follow a more heart healthy diet such as cutting out sodium, fat, etc.., Patient will be encouraged to focus on lifestyle modifications following discharge.      Nutrition Intervention/plan:   After discharge patient will continue to maintain healthy lifestyle habits and focus on risk factor modification.    Measurable goals were based Rate Your Plate Dietary Self-Assessment. These are the areas in which the patient could score higher on the assessment.  Goals include recommendations for a heart healthy diet based on American Heart Association.    Nutrition Education:   heart healthy eating principles  low sodium diet  maintaining hydration  nutrition for  lipid management  group class: Heart Healthy Eating  group class:  Label Reading    Readiness to change: Maintenance: (Maintaining the behavior change)      PSYCHOSOCIAL GOALS AND PLAN    Psychosocial Assessment as it relates to rehabilitation:   Patient denies issues with his/her family or home life that may affect their rehabilitation efforts.     Patient's progress toward Psychosocial goals:    Goals met: physical fitness in [facility] =2, continuing to manage stress and anxiety, uses wood working as a stress reliever., Patient will be encouraged to focus on lifestyle modifications following discharge.    Psychosocial Intervention/plan:   After discharge patient will continue to maintain healthy lifestyle habits and focus on risk factor modification.    Psychosocial Education: signs/sxs of depression, benefits of a positive support system, stress management techniques, and class:  Stress and Your Health     Information to utilize [employer] was provided as well as contact information for counseling through  Behavioral [facility] and group psychotherapy groups available.    Readiness to change: Maintenance: (Maintaining  the behavior change)      OTHER CORE COMPONENTS GOALS and PLAN      Blood Pressure will be monitored throughout the program and cardiologist will be notified of elevated trends.    Pt will be encouraged to monitor home BP if advised by cardiologist.    Tobacco Intervention/plan:   N/A:  Pt is a non-smoker    Progress toward Core Component goals:   Goals met: BP WNL, medication compliance reported, checks weight at home on regular basis and will report any abnormal changes, monitors BP at home and will reports any abnormal changes., Patient will be encouraged to focus on lifestyle modifications following discharge.    Other Core Components Intervention:   After discharge patient will continue to maintain healthy lifestyle habits and focus on risk factor modification.    Group and Individual Education:  understanding high blood pressure and it's relationship to CAD, components of blood pressure management, group class: Understanding Heart Disease, and group class:  Common Heart Medications    Readiness to change: Maintenance: (Maintaining the behavior change)

## (undated) DEVICE — STAPLER ENDO HERNIA 4.0 X 12MM

## (undated) DEVICE — HARMONIC HOOK 5 MM HDH05D

## (undated) DEVICE — GLOVE SRG BIOGEL 7

## (undated) DEVICE — ELECTRODE BLADE MOD E-Z CLEAN 2.5IN 6.4CM -0012M

## (undated) DEVICE — PACK PBDS LAP CHOLE RF

## (undated) DEVICE — GLOVE INDICATOR PI UNDERGLOVE SZ 6.5 BLUE

## (undated) DEVICE — GLOVE SRG BIOGEL 6.5

## (undated) DEVICE — SUT PDS II 2-0 SH 27 IN Z317H

## (undated) DEVICE — TRAY FOLEY 16FR URIMETER SURESTEP

## (undated) DEVICE — ABSORBABLE WOUND CLOSURE DEVICE: Brand: V-LOC 90

## (undated) DEVICE — HARMONIC DISPOSABLE HAND SWITCHING ADAPTORS: Brand: HARMONIC

## (undated) DEVICE — TOWEL SET X-RAY

## (undated) DEVICE — NEEDLE 25G X 1 1/2

## (undated) DEVICE — SUT MONOCRYL 4-0 PS-2 18 IN Y496G

## (undated) DEVICE — 2000CC GUARDIAN II: Brand: GUARDIAN

## (undated) DEVICE — CHLORAPREP HI-LITE 26ML ORANGE

## (undated) DEVICE — SUT VICRYL 0 REEL 54 IN J287G

## (undated) DEVICE — BLUE HEAT SCOPE WARMER

## (undated) DEVICE — TROCARS: Brand: KII® BALLOON BLUNT TIP SYSTEM

## (undated) DEVICE — SUT MONOCRYL 4-0 PS-2 27 IN Y426H

## (undated) DEVICE — DRAPE EQUIPMENT RF WAND

## (undated) DEVICE — GLOVE INDICATOR PI UNDERGLOVE SZ 8 BLUE

## (undated) DEVICE — VIAL DECANTER

## (undated) DEVICE — INTENDED FOR TISSUE SEPARATION, AND OTHER PROCEDURES THAT REQUIRE A SHARP SURGICAL BLADE TO PUNCTURE OR CUT.: Brand: BARD-PARKER SAFETY BLADES SIZE 15, STERILE

## (undated) DEVICE — SUT PROLENE 2-0 CT-2 30 IN 8411H

## (undated) DEVICE — ALLENTOWN LAP CHOLE APP PACK: Brand: CARDINAL HEALTH

## (undated) DEVICE — TROCAR: Brand: KII® SLEEVE

## (undated) DEVICE — TROCAR: Brand: KII FIOS FIRST ENTRY

## (undated) DEVICE — PAD GROUNDING ADULT

## (undated) DEVICE — ADHESIVE SKN CLSR HISTOACRYL FLEX 0.5ML LF

## (undated) DEVICE — SYRINGE CATH TIP 50ML

## (undated) DEVICE — INTENDED FOR TISSUE SEPARATION, AND OTHER PROCEDURES THAT REQUIRE A SHARP SURGICAL BLADE TO PUNCTURE OR CUT.: Brand: BARD-PARKER SAFETY BLADES SIZE 11, STERILE

## (undated) DEVICE — ENDOPATH 5MM CURVED SCISSORS WITH MONOPOLAR CAUTERY: Brand: ENDOPATH

## (undated) DEVICE — GLOVE SRG BIOGEL ECLIPSE 8

## (undated) DEVICE — SUT VICRYL 0 UR-6 27 IN J603H

## (undated) DEVICE — ENDOPATH 5MM ENDOSCOPIC BLUNT TIP DISSECTORS (12 POUCHES CONTAINING 3 DISSECTORS EACH): Brand: ENDOPATH

## (undated) DEVICE — SCD SEQUENTIAL COMPRESSION COMFORT SLEEVE MEDIUM KNEE LENGTH: Brand: KENDALL SCD

## (undated) DEVICE — GLOVE INDICATOR PI UNDERGLOVE SZ 7.5 BLUE

## (undated) DEVICE — SPONGE 4 X 4 XRAY 16 PLY STRL LF RFD

## (undated) DEVICE — SUT VICRYL 3-0 SH 27 IN J416H